# Patient Record
Sex: MALE | Race: OTHER | NOT HISPANIC OR LATINO
[De-identification: names, ages, dates, MRNs, and addresses within clinical notes are randomized per-mention and may not be internally consistent; named-entity substitution may affect disease eponyms.]

---

## 2023-09-07 PROBLEM — Z00.00 ENCOUNTER FOR PREVENTIVE HEALTH EXAMINATION: Status: ACTIVE | Noted: 2023-09-07

## 2023-11-21 ENCOUNTER — APPOINTMENT (OUTPATIENT)
Dept: NEUROLOGY | Facility: CLINIC | Age: 88
End: 2023-11-21

## 2024-07-19 ENCOUNTER — APPOINTMENT (OUTPATIENT)
Dept: OTOLARYNGOLOGY | Facility: CLINIC | Age: 89
End: 2024-07-19
Payer: MEDICARE

## 2024-07-19 VITALS — BODY MASS INDEX: 31.02 KG/M2 | WEIGHT: 200 LBS | HEIGHT: 67.5 IN

## 2024-07-19 DIAGNOSIS — H93.13 TINNITUS, BILATERAL: ICD-10-CM

## 2024-07-19 DIAGNOSIS — H90.3 SENSORINEURAL HEARING LOSS, BILATERAL: ICD-10-CM

## 2024-07-19 PROCEDURE — 99204 OFFICE O/P NEW MOD 45 MIN: CPT

## 2024-07-19 PROCEDURE — 92550 TYMPANOMETRY & REFLEX THRESH: CPT | Mod: 52

## 2024-07-19 PROCEDURE — 92557 COMPREHENSIVE HEARING TEST: CPT

## 2024-07-25 ENCOUNTER — INPATIENT (INPATIENT)
Facility: HOSPITAL | Age: 89
LOS: 2 days | Discharge: HOME HEALTH | DRG: 312 | End: 2024-07-28
Attending: INTERNAL MEDICINE | Admitting: STUDENT IN AN ORGANIZED HEALTH CARE EDUCATION/TRAINING PROGRAM
Payer: MEDICARE

## 2024-07-25 VITALS
WEIGHT: 199.96 LBS | TEMPERATURE: 98 F | OXYGEN SATURATION: 96 % | HEART RATE: 64 BPM | DIASTOLIC BLOOD PRESSURE: 72 MMHG | SYSTOLIC BLOOD PRESSURE: 133 MMHG | RESPIRATION RATE: 16 BRPM

## 2024-07-25 DIAGNOSIS — R55 SYNCOPE AND COLLAPSE: ICD-10-CM

## 2024-07-25 LAB
ALBUMIN SERPL ELPH-MCNC: 4.4 G/DL — SIGNIFICANT CHANGE UP (ref 3.5–5.2)
ALP SERPL-CCNC: 68 U/L — SIGNIFICANT CHANGE UP (ref 30–115)
ALT FLD-CCNC: 12 U/L — SIGNIFICANT CHANGE UP (ref 0–41)
ANION GAP SERPL CALC-SCNC: 14 MMOL/L — SIGNIFICANT CHANGE UP (ref 7–14)
AST SERPL-CCNC: 21 U/L — SIGNIFICANT CHANGE UP (ref 0–41)
BASOPHILS # BLD AUTO: 0.02 K/UL — SIGNIFICANT CHANGE UP (ref 0–0.2)
BASOPHILS NFR BLD AUTO: 0.1 % — SIGNIFICANT CHANGE UP (ref 0–1)
BILIRUB SERPL-MCNC: 1.2 MG/DL — SIGNIFICANT CHANGE UP (ref 0.2–1.2)
BUN SERPL-MCNC: 48 MG/DL — HIGH (ref 10–20)
CALCIUM SERPL-MCNC: 9.6 MG/DL — SIGNIFICANT CHANGE UP (ref 8.4–10.5)
CHLORIDE SERPL-SCNC: 108 MMOL/L — SIGNIFICANT CHANGE UP (ref 98–110)
CK SERPL-CCNC: 665 U/L — HIGH (ref 0–225)
CO2 SERPL-SCNC: 18 MMOL/L — SIGNIFICANT CHANGE UP (ref 17–32)
CREAT SERPL-MCNC: 1.7 MG/DL — HIGH (ref 0.7–1.5)
EGFR: 38 ML/MIN/1.73M2 — LOW
EOSINOPHIL # BLD AUTO: 0.01 K/UL — SIGNIFICANT CHANGE UP (ref 0–0.7)
EOSINOPHIL NFR BLD AUTO: 0.1 % — SIGNIFICANT CHANGE UP (ref 0–8)
GLUCOSE SERPL-MCNC: 131 MG/DL — HIGH (ref 70–99)
HCT VFR BLD CALC: 33.9 % — LOW (ref 42–52)
HGB BLD-MCNC: 10.9 G/DL — LOW (ref 14–18)
IMM GRANULOCYTES NFR BLD AUTO: 0.5 % — HIGH (ref 0.1–0.3)
LYMPHOCYTES # BLD AUTO: 0.4 K/UL — LOW (ref 1.2–3.4)
LYMPHOCYTES # BLD AUTO: 2.9 % — LOW (ref 20.5–51.1)
MCHC RBC-ENTMCNC: 30.4 PG — SIGNIFICANT CHANGE UP (ref 27–31)
MCHC RBC-ENTMCNC: 32.2 G/DL — SIGNIFICANT CHANGE UP (ref 32–37)
MCV RBC AUTO: 94.7 FL — HIGH (ref 80–94)
MONOCYTES # BLD AUTO: 0.69 K/UL — HIGH (ref 0.1–0.6)
MONOCYTES NFR BLD AUTO: 5 % — SIGNIFICANT CHANGE UP (ref 1.7–9.3)
NEUTROPHILS # BLD AUTO: 12.56 K/UL — HIGH (ref 1.4–6.5)
NEUTROPHILS NFR BLD AUTO: 91.4 % — HIGH (ref 42.2–75.2)
NRBC # BLD: 0 /100 WBCS — SIGNIFICANT CHANGE UP (ref 0–0)
PLATELET # BLD AUTO: 224 K/UL — SIGNIFICANT CHANGE UP (ref 130–400)
PMV BLD: 9.8 FL — SIGNIFICANT CHANGE UP (ref 7.4–10.4)
POTASSIUM SERPL-MCNC: 4.5 MMOL/L — SIGNIFICANT CHANGE UP (ref 3.5–5)
POTASSIUM SERPL-SCNC: 4.5 MMOL/L — SIGNIFICANT CHANGE UP (ref 3.5–5)
PROT SERPL-MCNC: 6.9 G/DL — SIGNIFICANT CHANGE UP (ref 6–8)
RBC # BLD: 3.58 M/UL — LOW (ref 4.7–6.1)
RBC # FLD: 13.5 % — SIGNIFICANT CHANGE UP (ref 11.5–14.5)
SODIUM SERPL-SCNC: 140 MMOL/L — SIGNIFICANT CHANGE UP (ref 135–146)
TROPONIN T, HIGH SENSITIVITY RESULT: 64 NG/L — CRITICAL HIGH (ref 6–21)
WBC # BLD: 13.75 K/UL — HIGH (ref 4.8–10.8)
WBC # FLD AUTO: 13.75 K/UL — HIGH (ref 4.8–10.8)

## 2024-07-25 PROCEDURE — 12034 INTMD RPR S/TR/EXT 7.6-12.5: CPT | Mod: GC

## 2024-07-25 PROCEDURE — 83735 ASSAY OF MAGNESIUM: CPT

## 2024-07-25 PROCEDURE — 80053 COMPREHEN METABOLIC PANEL: CPT

## 2024-07-25 PROCEDURE — 95819 EEG AWAKE AND ASLEEP: CPT

## 2024-07-25 PROCEDURE — 99285 EMERGENCY DEPT VISIT HI MDM: CPT | Mod: 25

## 2024-07-25 PROCEDURE — 74177 CT ABD & PELVIS W/CONTRAST: CPT | Mod: 26,MC

## 2024-07-25 PROCEDURE — 85025 COMPLETE CBC W/AUTO DIFF WBC: CPT

## 2024-07-25 PROCEDURE — 71260 CT THORAX DX C+: CPT | Mod: 26,MC

## 2024-07-25 PROCEDURE — 36415 COLL VENOUS BLD VENIPUNCTURE: CPT

## 2024-07-25 PROCEDURE — 83036 HEMOGLOBIN GLYCOSYLATED A1C: CPT

## 2024-07-25 PROCEDURE — 93010 ELECTROCARDIOGRAM REPORT: CPT

## 2024-07-25 PROCEDURE — 84484 ASSAY OF TROPONIN QUANT: CPT

## 2024-07-25 PROCEDURE — 84100 ASSAY OF PHOSPHORUS: CPT

## 2024-07-25 PROCEDURE — 93306 TTE W/DOPPLER COMPLETE: CPT

## 2024-07-25 PROCEDURE — 93005 ELECTROCARDIOGRAM TRACING: CPT

## 2024-07-25 PROCEDURE — 72125 CT NECK SPINE W/O DYE: CPT | Mod: 26,MC

## 2024-07-25 PROCEDURE — 73090 X-RAY EXAM OF FOREARM: CPT | Mod: 26,LT

## 2024-07-25 PROCEDURE — 97162 PT EVAL MOD COMPLEX 30 MIN: CPT | Mod: GP

## 2024-07-25 PROCEDURE — 73080 X-RAY EXAM OF ELBOW: CPT | Mod: 26,LT

## 2024-07-25 PROCEDURE — 84443 ASSAY THYROID STIM HORMONE: CPT

## 2024-07-25 PROCEDURE — 73110 X-RAY EXAM OF WRIST: CPT | Mod: 26,LT

## 2024-07-25 PROCEDURE — 73130 X-RAY EXAM OF HAND: CPT | Mod: 26,LT

## 2024-07-25 PROCEDURE — 93010 ELECTROCARDIOGRAM REPORT: CPT | Mod: 77

## 2024-07-25 PROCEDURE — 12004 RPR S/N/AX/GEN/TRK7.6-12.5CM: CPT | Mod: GC

## 2024-07-25 PROCEDURE — 70450 CT HEAD/BRAIN W/O DYE: CPT | Mod: 26,MC

## 2024-07-25 PROCEDURE — 82550 ASSAY OF CK (CPK): CPT

## 2024-07-25 PROCEDURE — 80061 LIPID PANEL: CPT

## 2024-07-25 RX ORDER — CLOSTRIDIUM TETANI TOXOID ANTIGEN (FORMALDEHYDE INACTIVATED), CORYNEBACTERIUM DIPHTHERIAE TOXOID ANTIGEN (FORMALDEHYDE INACTIVATED), BORDETELLA PERTUSSIS TOXOID ANTIGEN (GLUTARALDEHYDE INACTIVATED), BORDETELLA PERTUSSIS FILAMENTOUS HEMAGGLUTININ ANTIGEN (FORMALDEHYDE INACTIVATED), BORDETELLA PERTUSSIS PERTACTIN ANTIGEN, AND BORDETELLA PERTUSSIS FIMBRIAE 2/3 ANTIGEN 5; 2; 2.5; 5; 3; 5 [LF]/.5ML; [LF]/.5ML; UG/.5ML; UG/.5ML; UG/.5ML; UG/.5ML
0.5 INJECTION, SUSPENSION INTRAMUSCULAR ONCE
Refills: 0 | Status: DISCONTINUED | OUTPATIENT
Start: 2024-07-25 | End: 2024-07-28

## 2024-07-25 NOTE — ED PROVIDER NOTE - OBJECTIVE STATEMENT
Patient is a 89-year-old male past medical history of dementia presenting to ED for evaluation of syncopal episode earlier today.  Patient was found by aide crawling on the ground with laceration to left side of his face and left arm, patient was too weak to get up and walk on his own.  Patient denies any complaints on arrival to the ED.

## 2024-07-25 NOTE — ED ADULT NURSE NOTE - NSFALLHARMRISKINTERV_ED_ALL_ED

## 2024-07-25 NOTE — ED PROVIDER NOTE - PROGRESS NOTE DETAILS
AE: Imaging is unremarkable. Lacerations are being repaired. Labs significant for AILYN, elevated troponin, and elevated CK. Will admit to [facility] for syncope workup.

## 2024-07-25 NOTE — ED PROVIDER NOTE - PHYSICAL EXAMINATION
Constitutional: elderly appearing  TRAUMA: ABC intact. GCS 15. FAST negative.  Head: abrasions to left side of the face  Eyes: PERRL. EOMI. No Raccoon eyes.   ENT: No nasal discharge. No septal hematoma. No Durbin sign. Mucous membranes moist.  Neck: Supple. Painless ROM. No midline tenderness, stepoffs.  Cardiovascular: Normal S1, S2. Regular rate and rhythm. No murmurs, rubs, or gallops.  Pulmonary: Normal respiratory rate and effort. Lungs clear to auscultation bilaterally. No wheezing, rales, or rhonchi.  CHEST: No chest wall tenderness, crepitus.  Abdominal: Soft. Nondistended. Nontender. No rebound, guarding, rigidity.  BACK: No midline T/L/S tenderness, stepoffs. No saddle paresthesia.  Extremities. Pelvis stable. No traumatic deformities, tenderness of extremities.  Skin: 2 cm and 5 cm laceration to dorsal aspect of left forearm  Neuro: AAOx3. Strength 5/5 in all extremities. Sensation intact throughout. No focal neurological deficits.  Psych: Normal mood. Normal affect.

## 2024-07-25 NOTE — ED PROVIDER NOTE - CLINICAL SUMMARY MEDICAL DECISION MAKING FREE TEXT BOX
Plan for CT pan scans to evaluate for intra-abdominal intrathoracic injuries.  Laceration repair.  And likely will need admission is unable to ambulate.       b Plan for CT pan scans to evaluate for intra-abdominal intrathoracic injuries.  Laceration repair.  And likely will need admission is unable to ambulate.      CT pan scan without any acute traumatic injury

## 2024-07-25 NOTE — ED PROVIDER NOTE - ATTENDING CONTRIBUTION TO CARE
89-year-old male to ED with left face and left arm laceration.  Patient was found down by living partner when she came home after work.  Patient states he fell this morning was crawling around on the house because he could not get back up.  No LOC no nausea vomiting denies any seizure activity.  He takes aspirin but no other anticoagulation.  No evidence of extremity bony injury but he does have lacerations to the left forearm and left face.  Patient has no specific complaints.

## 2024-07-25 NOTE — ED ADULT NURSE NOTE - OBJECTIVE STATEMENT
patient states he fell this morning and was unable to get up due to weakness. was found on floor after several hours. patient is denying any pain. patient has several lacerations to left arm. patient states he hit his head but did not pass out. denies bt. denies any chest pain/dizziness/sob

## 2024-07-25 NOTE — ED PROVIDER NOTE - CARE PLAN
1 Principal Discharge DX:	Syncope  Secondary Diagnosis:	AILYN (acute kidney injury)  Secondary Diagnosis:	Elevated troponin  Secondary Diagnosis:	Multiple lacerations

## 2024-07-26 ENCOUNTER — TRANSCRIPTION ENCOUNTER (OUTPATIENT)
Age: 89
End: 2024-07-26

## 2024-07-26 ENCOUNTER — RESULT REVIEW (OUTPATIENT)
Age: 89
End: 2024-07-26

## 2024-07-26 LAB
A1C WITH ESTIMATED AVERAGE GLUCOSE RESULT: 6.1 % — HIGH (ref 4–5.6)
ALBUMIN SERPL ELPH-MCNC: 3.7 G/DL — SIGNIFICANT CHANGE UP (ref 3.5–5.2)
ALP SERPL-CCNC: 58 U/L — SIGNIFICANT CHANGE UP (ref 30–115)
ALT FLD-CCNC: 12 U/L — SIGNIFICANT CHANGE UP (ref 0–41)
ANION GAP SERPL CALC-SCNC: 12 MMOL/L — SIGNIFICANT CHANGE UP (ref 7–14)
AST SERPL-CCNC: 25 U/L — SIGNIFICANT CHANGE UP (ref 0–41)
BASOPHILS # BLD AUTO: 0.03 K/UL — SIGNIFICANT CHANGE UP (ref 0–0.2)
BASOPHILS NFR BLD AUTO: 0.3 % — SIGNIFICANT CHANGE UP (ref 0–1)
BILIRUB SERPL-MCNC: 1.4 MG/DL — HIGH (ref 0.2–1.2)
BUN SERPL-MCNC: 42 MG/DL — HIGH (ref 10–20)
CALCIUM SERPL-MCNC: 9.1 MG/DL — SIGNIFICANT CHANGE UP (ref 8.4–10.5)
CHLORIDE SERPL-SCNC: 107 MMOL/L — SIGNIFICANT CHANGE UP (ref 98–110)
CHOLEST SERPL-MCNC: 97 MG/DL — SIGNIFICANT CHANGE UP
CK SERPL-CCNC: 1109 U/L — HIGH (ref 0–225)
CK SERPL-CCNC: 1154 U/L — HIGH (ref 0–225)
CO2 SERPL-SCNC: 20 MMOL/L — SIGNIFICANT CHANGE UP (ref 17–32)
CREAT SERPL-MCNC: 1.8 MG/DL — HIGH (ref 0.7–1.5)
EGFR: 35 ML/MIN/1.73M2 — LOW
EOSINOPHIL # BLD AUTO: 0.04 K/UL — SIGNIFICANT CHANGE UP (ref 0–0.7)
EOSINOPHIL NFR BLD AUTO: 0.4 % — SIGNIFICANT CHANGE UP (ref 0–8)
ESTIMATED AVERAGE GLUCOSE: 128 MG/DL — HIGH (ref 68–114)
GLUCOSE SERPL-MCNC: 101 MG/DL — HIGH (ref 70–99)
HCT VFR BLD CALC: 30.6 % — LOW (ref 42–52)
HDLC SERPL-MCNC: 44 MG/DL — SIGNIFICANT CHANGE UP
HGB BLD-MCNC: 9.9 G/DL — LOW (ref 14–18)
IMM GRANULOCYTES NFR BLD AUTO: 0.6 % — HIGH (ref 0.1–0.3)
LIPID PNL WITH DIRECT LDL SERPL: 43 MG/DL — SIGNIFICANT CHANGE UP
LYMPHOCYTES # BLD AUTO: 0.81 K/UL — LOW (ref 1.2–3.4)
LYMPHOCYTES # BLD AUTO: 7.3 % — LOW (ref 20.5–51.1)
MAGNESIUM SERPL-MCNC: 2.2 MG/DL — SIGNIFICANT CHANGE UP (ref 1.8–2.4)
MCHC RBC-ENTMCNC: 30.4 PG — SIGNIFICANT CHANGE UP (ref 27–31)
MCHC RBC-ENTMCNC: 32.4 G/DL — SIGNIFICANT CHANGE UP (ref 32–37)
MCV RBC AUTO: 93.9 FL — SIGNIFICANT CHANGE UP (ref 80–94)
MONOCYTES # BLD AUTO: 0.93 K/UL — HIGH (ref 0.1–0.6)
MONOCYTES NFR BLD AUTO: 8.4 % — SIGNIFICANT CHANGE UP (ref 1.7–9.3)
NEUTROPHILS # BLD AUTO: 9.18 K/UL — HIGH (ref 1.4–6.5)
NEUTROPHILS NFR BLD AUTO: 83 % — HIGH (ref 42.2–75.2)
NON HDL CHOLESTEROL: 53 MG/DL — SIGNIFICANT CHANGE UP
NRBC # BLD: 0 /100 WBCS — SIGNIFICANT CHANGE UP (ref 0–0)
PLATELET # BLD AUTO: 224 K/UL — SIGNIFICANT CHANGE UP (ref 130–400)
PMV BLD: 9.8 FL — SIGNIFICANT CHANGE UP (ref 7.4–10.4)
POTASSIUM SERPL-MCNC: 4.4 MMOL/L — SIGNIFICANT CHANGE UP (ref 3.5–5)
POTASSIUM SERPL-SCNC: 4.4 MMOL/L — SIGNIFICANT CHANGE UP (ref 3.5–5)
PROT SERPL-MCNC: 5.8 G/DL — LOW (ref 6–8)
RBC # BLD: 3.26 M/UL — LOW (ref 4.7–6.1)
RBC # FLD: 13.7 % — SIGNIFICANT CHANGE UP (ref 11.5–14.5)
SODIUM SERPL-SCNC: 139 MMOL/L — SIGNIFICANT CHANGE UP (ref 135–146)
TRIGL SERPL-MCNC: 54 MG/DL — SIGNIFICANT CHANGE UP
TROPONIN T, HIGH SENSITIVITY RESULT: 62 NG/L — CRITICAL HIGH (ref 6–21)
TROPONIN T, HIGH SENSITIVITY RESULT: 89 NG/L — CRITICAL HIGH (ref 6–21)
TSH SERPL-MCNC: 0.58 UIU/ML — SIGNIFICANT CHANGE UP (ref 0.27–4.2)
WBC # BLD: 11.06 K/UL — HIGH (ref 4.8–10.8)
WBC # FLD AUTO: 11.06 K/UL — HIGH (ref 4.8–10.8)

## 2024-07-26 PROCEDURE — 95816 EEG AWAKE AND DROWSY: CPT | Mod: 26

## 2024-07-26 PROCEDURE — 99223 1ST HOSP IP/OBS HIGH 75: CPT | Mod: AI

## 2024-07-26 PROCEDURE — 93306 TTE W/DOPPLER COMPLETE: CPT | Mod: 26

## 2024-07-26 RX ORDER — ONDANSETRON HCL/PF 4 MG/2 ML
4 VIAL (ML) INJECTION EVERY 8 HOURS
Refills: 0 | Status: DISCONTINUED | OUTPATIENT
Start: 2024-07-26 | End: 2024-07-28

## 2024-07-26 RX ORDER — ATORVASTATIN CALCIUM 40 MG/1
20 TABLET, FILM COATED ORAL AT BEDTIME
Refills: 0 | Status: DISCONTINUED | OUTPATIENT
Start: 2024-07-26 | End: 2024-07-28

## 2024-07-26 RX ORDER — HEPARIN SODIUM 1000 [USP'U]/ML
5000 INJECTION, SOLUTION INTRAVENOUS; SUBCUTANEOUS EVERY 12 HOURS
Refills: 0 | Status: DISCONTINUED | OUTPATIENT
Start: 2024-07-26 | End: 2024-07-28

## 2024-07-26 RX ORDER — ATORVASTATIN CALCIUM 40 MG/1
1 TABLET, FILM COATED ORAL
Refills: 0 | DISCHARGE

## 2024-07-26 RX ORDER — MAGNESIUM, ALUMINUM HYDROXIDE 200-225/5
30 SUSPENSION, ORAL (FINAL DOSE FORM) ORAL EVERY 4 HOURS
Refills: 0 | Status: DISCONTINUED | OUTPATIENT
Start: 2024-07-26 | End: 2024-07-28

## 2024-07-26 RX ORDER — MELATONIN 3 MG
3 TABLET ORAL AT BEDTIME
Refills: 0 | Status: DISCONTINUED | OUTPATIENT
Start: 2024-07-26 | End: 2024-07-28

## 2024-07-26 RX ORDER — ACETAMINOPHEN 500 MG
650 TABLET ORAL EVERY 6 HOURS
Refills: 0 | Status: DISCONTINUED | OUTPATIENT
Start: 2024-07-26 | End: 2024-07-28

## 2024-07-26 RX ORDER — DEXTROSE MONOHYDRATE, SODIUM CHLORIDE, SODIUM LACTATE, CALCIUM CHLORIDE, MAGNESIUM CHLORIDE 1.5; 538; 448; 18.4; 5.08 G/100ML; MG/100ML; MG/100ML; MG/100ML; MG/100ML
1000 SOLUTION INTRAPERITONEAL
Refills: 0 | Status: DISCONTINUED | OUTPATIENT
Start: 2024-07-26 | End: 2024-07-28

## 2024-07-26 RX ADMIN — ATORVASTATIN CALCIUM 20 MILLIGRAM(S): 40 TABLET, FILM COATED ORAL at 21:33

## 2024-07-26 RX ADMIN — DEXTROSE MONOHYDRATE, SODIUM CHLORIDE, SODIUM LACTATE, CALCIUM CHLORIDE, MAGNESIUM CHLORIDE 75 MILLILITER(S): 1.5; 538; 448; 18.4; 5.08 SOLUTION INTRAPERITONEAL at 08:00

## 2024-07-26 NOTE — PHYSICAL THERAPY INITIAL EVALUATION ADULT - ADDITIONAL COMMENTS
pt lives in a private house with his girlfriend, 5+5 steps to enter with B rails, no steps inside, PTA pt amb with RW and required assist with ADLs

## 2024-07-26 NOTE — PHYSICAL THERAPY INITIAL EVALUATION ADULT - GENERAL OBSERVATIONS, REHAB EVAL
13:45-14:29 44 min  pt received in bed in NAD, family at the b/s, pt agreeable to PT, RN disconnected IV for amb, pt had no c/o, orthostatic BPs from earlier were negative

## 2024-07-26 NOTE — DISCHARGE NOTE NURSING/CASE MANAGEMENT/SOCIAL WORK - PATIENT PORTAL LINK FT
You can access the FollowMyHealth Patient Portal offered by [facility] by registering at the following website: [email or web address]. By joining [facility]’s FollowMyHealth portal, you will also be able to view your health information using other applications (apps) compatible with our system.

## 2024-07-26 NOTE — H&P ADULT - HISTORY OF PRESENT ILLNESS
89 years old male patient with PMHx of HTN, HLD , CKD 3 brought to the ed for the evaluation of the fall. Hx taken from the patient and is aox3 at time of interview. Patient states that at around 9am he was going to the bathroom, and on way he tipped and fell. He does not remember any events after that. Patient is a poor historian. Hx taken form the spouse Yaquelin at the bedsides as well. Spouse states that when she came back from the work at 4pm, she found the patient on floor socked with blood. Patient was unable to get up on her own. She also noted the blood in the bathroom as well. Spouse also states that, patient is independent in activities of his daily life.  Further review and medical hx is unable to be obtained due to patient medical condition.     ed vitals    · BP Systolic	133 mm Hg  · BP Diastolic	72 mm Hg  · Heart Rate	64 /min  · Respiration Rate (breaths/min)	16 /min  · Temp (F)	98 Degrees F  · Temp (C)	36.7 Degrees C  · Temp site	oral  · SpO2 (%)	96 %  · O2 Delivery/Oxygen Delivery Method	room air  · Temp at ED Arrival (C)	36.7 Degrees C    Labs    wbc 13, hb 10, Trops 64-->62, BUN/Creat 48/1.7, gfr 38, cpk 665    Imaging    CT chest/abdomen/pelvis: IMPRESSION: No current CT evidence of acute traumatic injury to the chest and abdomen. Sigmoid diverticulosis. Extensive coronary artery calcifications    CT head/cervical spine: no acute pathology

## 2024-07-26 NOTE — H&P ADULT - NSHPREVIEWOFSYSTEMS_GEN_ALL_CORE
REVIEW OF SYSTEMS:    CONSTITUTIONAL:  No weakness, fevers or chills  EYES/ENT:  No visual changes;  No vertigo or throat pain   NECK:  No pain or stiffness  RESPIRATORY:  No cough, wheezing, hemoptysis; No shortness of breath  CARDIOVASCULAR:  No chest pain or palpitations  GASTROINTESTINAL:  No abdominal or epigastric pain. No nausea, vomiting, or hematemesis; No diarrhea or constipation. No melena or hematochezia.  GENITOURINARY:  No dysuria, frequency or hematuria  NEUROLOGICAL:  No numbness or weakness  SKIN:  bruises over upper limbs, laceration over forehead and left arm

## 2024-07-26 NOTE — ED PROCEDURE NOTE - CPROC ED TIME OUT STATEMENT1
“Patient's name, , procedure and correct site were confirmed during the [place] Timeout.”
“Patient's name, , procedure and correct site were confirmed during the [place] Timeout.”
“Patient's name, , procedure and correct site were confirmed during the [place] Timeout.”

## 2024-07-26 NOTE — H&P ADULT - ASSESSMENT
89 years old male patient with PMHx of HTN, HLD , CKD 3 brought to the ed for the evaluation of the fall. Hx taken from the patient and is aox3 at time of interview. Patient states that at around 9am he was going to the bathroom, and on way he tipped and fell. He does not remember any events after that. Patient is a poor historian. Hx taken form the spouse Yaquelin at the bedsides as well. Spouse states that when she came back from the work at 4pm, she found the patient on floor socked with blood. Patient was unable to get up on her own. She also noted the blood in the bathroom as well. Spouse also states that, patient is independent in activities of his daily life.       #Unwitnessed Fall with laceration to left arm   #Elevated trops   #Eleaveted CPK  - EKG   - eeg  - echo  - doppler carotid  - TSH  - Telemetry monitor  - orthostatics vitals  - PT/OT consult  - s/p laceration repair on left arm in ed     #AILYN in CKDIII  - unknown baseline creat.   - creat 7/25... 1.7   - likely dehydration  - IV Fluid LR 75ml/hr   - avoid nephrotoxic drugs  - adjust meds for egfr       #HLD  #HTN  - Hold benazapril.  -  cw hydrochlorothiazide  - cw lipitor       DVT PPX: scd  GI PPX: none   DIET: dash   ACTIVITY:   CODE STATUS: full   DISPOSITION:     PENDING:               DVT PPX:   GI PPX:   DIET:   ACTIVITY:   CODE STATUS:   DISPOSITION:     PENDING:  89 years old male patient with PMHx of HTN, HLD , CKD 3 brought to the ed for the evaluation of the fall. Hx taken from the patient and is aox3 at time of interview. Patient states that at around 9am he was going to the bathroom, and on way he tipped and fell. He does not remember any events after that. Patient is a poor historian. Hx taken form the spouse Yaquelin at the bedsides as well. Spouse states that when she came back from the work at 4pm, she found the patient on floor socked with blood. Patient was unable to get up on her own. She also noted the blood in the bathroom as well. Spouse also states that, patient is independent in activities of his daily life.       #Unwitnessed Fall with laceration to left arm   #Elevated trops   #Eleaveted CPK  - fu EKG   -fu  eeg  - fu echo  - fu doppler carotid  - fu TSH  - Telemetry monitor  - orthostatics vitals  - PT/OT consult  - s/p laceration repair on left arm in ed     #AILYN in CKDIII  - unknown baseline creat.   - creat 7/25... 1.7   - likely dehydration  - IV Fluid LR 75ml/hr . if no improvement send urine electrolytes, US kub   - avoid nephrotoxic drugs  - adjust meds for egfr       #HLD  #HTN  - Hold benazapril due to aliyn  - hold  hydrochlorothiazide due to ailyn   - cw Lipitor       DVT PPX: scd  GI PPX: none   DIET: dash   ACTIVITY:   CODE STATUS: full   DISPOSITION:     PENDING:       MED REC CONFIRMED WITH WIFE AT BEDSIDE

## 2024-07-26 NOTE — DISCHARGE NOTE NURSING/CASE MANAGEMENT/SOCIAL WORK - NSDCPEFALRISK_GEN_ALL_CORE
For information on Fall & Injury Prevention, visit: [email or web address] OR  [email or web address] OR  https://www.cdc.gov/steadi/patient.html

## 2024-07-26 NOTE — PATIENT PROFILE ADULT - FUNCTIONAL ASSESSMENT - BASIC MOBILITY 6.
3-calculated by average/Not able to assess (calculate score using [facility] averaging method)

## 2024-07-26 NOTE — H&P ADULT - NSHPPHYSICALEXAM_GEN_ALL_CORE
LOS: 1d    VITALS:   T(C): 37.1 (07-26-24 @ 03:13), Max: 37.1 (07-26-24 @ 03:13)  HR: 63 (07-26-24 @ 03:13) (61 - 64)  BP: 143/74 (07-26-24 @ 03:13) (126/69 - 143/74)  RR: 18 (07-26-24 @ 03:13) (16 - 18)  SpO2: 99% (07-26-24 @ 03:13) (96% - 99%)    GENERAL: NAD, lying in bed comfortably  HEAD:   abrasions to left side of the face  EYES: EOMI, PERRLA, conjunctiva and sclera clear  ENT: Moist mucous membranes  NECK: Supple, No JVD  CHEST/LUNG: Clear to auscultation bilaterally; No rales, rhonchi, wheezing, or rubs. Unlabored respirations  HEART: Regular rate and rhythm; No murmurs, rubs, or gallops  ABDOMEN: BSx4; Soft, nontender, nondistended  EXTREMITIES:  2+ Peripheral Pulses, brisk capillary refill. No clubbing, cyanosis, or edema  NERVOUS SYSTEM:  A&Ox3, no focal deficits   SKIN:  2 cm and 5 cm laceration to dorsal aspect of left forearm

## 2024-07-26 NOTE — PATIENT PROFILE ADULT - FALL HARM RISK - HARM RISK INTERVENTIONS

## 2024-07-26 NOTE — ED PROCEDURE NOTE - DEPTH OF REPAIR FOR WOUND CLOSURE
skin
Please make an appointment to follow up with your pediatrician for 1-2 days after discharge.

## 2024-07-27 LAB
ALBUMIN SERPL ELPH-MCNC: 3.9 G/DL — SIGNIFICANT CHANGE UP (ref 3.5–5.2)
ALP SERPL-CCNC: 56 U/L — SIGNIFICANT CHANGE UP (ref 30–115)
ALT FLD-CCNC: 16 U/L — SIGNIFICANT CHANGE UP (ref 0–41)
ANION GAP SERPL CALC-SCNC: 12 MMOL/L — SIGNIFICANT CHANGE UP (ref 7–14)
AST SERPL-CCNC: 28 U/L — SIGNIFICANT CHANGE UP (ref 0–41)
BASOPHILS # BLD AUTO: 0.04 K/UL — SIGNIFICANT CHANGE UP (ref 0–0.2)
BASOPHILS NFR BLD AUTO: 0.4 % — SIGNIFICANT CHANGE UP (ref 0–1)
BILIRUB SERPL-MCNC: 1 MG/DL — SIGNIFICANT CHANGE UP (ref 0.2–1.2)
BUN SERPL-MCNC: 47 MG/DL — HIGH (ref 10–20)
CALCIUM SERPL-MCNC: 9.2 MG/DL — SIGNIFICANT CHANGE UP (ref 8.4–10.5)
CHLORIDE SERPL-SCNC: 106 MMOL/L — SIGNIFICANT CHANGE UP (ref 98–110)
CK SERPL-CCNC: 964 U/L — HIGH (ref 0–225)
CO2 SERPL-SCNC: 21 MMOL/L — SIGNIFICANT CHANGE UP (ref 17–32)
CREAT SERPL-MCNC: 1.8 MG/DL — HIGH (ref 0.7–1.5)
EGFR: 35 ML/MIN/1.73M2 — LOW
EOSINOPHIL # BLD AUTO: 0.23 K/UL — SIGNIFICANT CHANGE UP (ref 0–0.7)
EOSINOPHIL NFR BLD AUTO: 2.4 % — SIGNIFICANT CHANGE UP (ref 0–8)
GLUCOSE SERPL-MCNC: 90 MG/DL — SIGNIFICANT CHANGE UP (ref 70–99)
HCT VFR BLD CALC: 28.1 % — LOW (ref 42–52)
HGB BLD-MCNC: 9.2 G/DL — LOW (ref 14–18)
IMM GRANULOCYTES NFR BLD AUTO: 0.5 % — HIGH (ref 0.1–0.3)
LYMPHOCYTES # BLD AUTO: 1.12 K/UL — LOW (ref 1.2–3.4)
LYMPHOCYTES # BLD AUTO: 11.8 % — LOW (ref 20.5–51.1)
MAGNESIUM SERPL-MCNC: 2.1 MG/DL — SIGNIFICANT CHANGE UP (ref 1.8–2.4)
MCHC RBC-ENTMCNC: 30.8 PG — SIGNIFICANT CHANGE UP (ref 27–31)
MCHC RBC-ENTMCNC: 32.7 G/DL — SIGNIFICANT CHANGE UP (ref 32–37)
MCV RBC AUTO: 94 FL — SIGNIFICANT CHANGE UP (ref 80–94)
MONOCYTES # BLD AUTO: 0.81 K/UL — HIGH (ref 0.1–0.6)
MONOCYTES NFR BLD AUTO: 8.5 % — SIGNIFICANT CHANGE UP (ref 1.7–9.3)
NEUTROPHILS # BLD AUTO: 7.28 K/UL — HIGH (ref 1.4–6.5)
NEUTROPHILS NFR BLD AUTO: 76.4 % — HIGH (ref 42.2–75.2)
NRBC # BLD: 0 /100 WBCS — SIGNIFICANT CHANGE UP (ref 0–0)
PHOSPHATE SERPL-MCNC: 3.1 MG/DL — SIGNIFICANT CHANGE UP (ref 2.1–4.9)
PLATELET # BLD AUTO: 217 K/UL — SIGNIFICANT CHANGE UP (ref 130–400)
PMV BLD: 10.2 FL — SIGNIFICANT CHANGE UP (ref 7.4–10.4)
POTASSIUM SERPL-MCNC: 4.3 MMOL/L — SIGNIFICANT CHANGE UP (ref 3.5–5)
POTASSIUM SERPL-SCNC: 4.3 MMOL/L — SIGNIFICANT CHANGE UP (ref 3.5–5)
PROT SERPL-MCNC: 5.6 G/DL — LOW (ref 6–8)
RBC # BLD: 2.99 M/UL — LOW (ref 4.7–6.1)
RBC # FLD: 13.8 % — SIGNIFICANT CHANGE UP (ref 11.5–14.5)
SODIUM SERPL-SCNC: 139 MMOL/L — SIGNIFICANT CHANGE UP (ref 135–146)
WBC # BLD: 9.53 K/UL — SIGNIFICANT CHANGE UP (ref 4.8–10.8)
WBC # FLD AUTO: 9.53 K/UL — SIGNIFICANT CHANGE UP (ref 4.8–10.8)

## 2024-07-27 PROCEDURE — 99232 SBSQ HOSP IP/OBS MODERATE 35: CPT

## 2024-07-27 PROCEDURE — 99222 1ST HOSP IP/OBS MODERATE 55: CPT

## 2024-07-27 RX ORDER — BACITRACIN 500 UNIT/G
1 OINTMENT (GRAM) TOPICAL
Refills: 0 | Status: DISCONTINUED | OUTPATIENT
Start: 2024-07-27 | End: 2024-07-28

## 2024-07-27 RX ADMIN — HEPARIN SODIUM 5000 UNIT(S): 1000 INJECTION, SOLUTION INTRAVENOUS; SUBCUTANEOUS at 05:15

## 2024-07-27 RX ADMIN — Medication 1 APPLICATION(S): at 17:46

## 2024-07-27 RX ADMIN — HEPARIN SODIUM 5000 UNIT(S): 1000 INJECTION, SOLUTION INTRAVENOUS; SUBCUTANEOUS at 17:46

## 2024-07-27 RX ADMIN — ATORVASTATIN CALCIUM 20 MILLIGRAM(S): 40 TABLET, FILM COATED ORAL at 21:06

## 2024-07-27 RX ADMIN — DEXTROSE MONOHYDRATE, SODIUM CHLORIDE, SODIUM LACTATE, CALCIUM CHLORIDE, MAGNESIUM CHLORIDE 75 MILLILITER(S): 1.5; 538; 448; 18.4; 5.08 SOLUTION INTRAPERITONEAL at 21:07

## 2024-07-27 NOTE — EEG REPORT - NS EEG TEXT BOX
[facility]   COMPREHENSIVE EPILEPSY CENTER   REPORT OF ROUTINE VIDEO EEG     [facility]: [address] [place] [address] , [address], [place] [address], Ph#: [phone]  [facility]: [id number], [place] [address], Ph#: [phone]  Office: [address] [place] [address], [facility] [address], [place] [address] Ph#: [phone]    Patient Name: JESÚS JACOBO  Age and : 89y ([birth year])  MRN #: [id number]  Location: [place] [address] [address]  Referring Physician: Jose Avalos    Study Date: 24    _____________________________________________________________  TECHNICAL INFORMATION    Placement and Labeling of Electrodes:  The EEG was performed utilizing 20 channels referential EEG connections (coronal over temporal over parasagittal montage) using all standard 10-20 electrode placements with EKG.  Recording was at a sampling rate of 256 samples per second per channel.  Time synchronized digital video recording was done simultaneously with EEG recording.  A low light infrared camera was used for low light recording.  Alhaji and seizure detection algorithms were utilized.    _____________________________________________________________  HISTORY    Patient is a 89y old  Male who presents with a chief complaint of fall (2024 03:16)      PERTINENT MEDICATION:  MEDICATIONS  (STANDING):  atorvastatin 20 milliGRAM(s) Oral at bedtime  diphtheria/tetanus/pertussis (acellular) Vaccine (Adacel) 0.5 milliLiter(s) IntraMuscular once  heparin   Injectable 5000 Unit(s) SubCutaneous every 12 hours  lactated ringers. 1000 milliLiter(s) (75 mL/Hr) IV Continuous <Continuous>    _____________________________________________________________  STUDY INTERPRETATION    Findings: The background was continuous, spontaneously variable and reactive. During wakefulness, the posterior dominant rhythm consisted of symmetric, well-modulated 7.5 Hz activity, with amplitude to 30 uV, that attenuated to eye opening.  Low amplitude frontal beta was noted in wakefulness.    Background Slowing:  -Slowing of PDR    Focal Slowing:   None was present.    Sleep Background:  Stage II sleep transients were not recorded.  Drowsiness and stage II sleep transients were not recorded.    Other Non-Epileptiform Findings:  None were present.    Interictal Epileptiform Activity:   None were present.    Events:  Clinical events: None recorded.  Seizures: None recorded.    Artifacts:  Intermittent myogenic and movement artifacts were noted.    ECG:  The heart rate on single channel ECG was predominantly between 60-80 BPM.    _____________________________________________________________  EEG SUMMARY/CLASSIFICATION    Abnormal EEG in the awake states.  -Mild generalized background slowing    _____________________________________________________________  EEG IMPRESSION/CLINICAL CORRELATE  Abnormal EEG study.  1. Mild nonspecific diffuse or multifocal cerebral dysfunction.   2. No epileptiform pattern or seizure seen.    Tyler Mcclendon MD  Neurology Attending Physician

## 2024-07-27 NOTE — PROGRESS NOTE ADULT - ASSESSMENT
89 years old male patient with history of HTN, HLD , CKD 3 was brought to ED after unwitnessed fall and trauma to head and left arm, patient was going to the bathroom, and on way he tipped and fell. He denies loss of consciousness, Patient was feeling weak and unable to get up, in the ED vital signs were stable, he was noted with multiple lacerations of left arm and small abrasion on th left face. Labs showed Cr 1.8, Troponin 64, , EKG showed RBBB and LAFB, CT head , cervical spine, chest and abdomen showed no acute traumatic injury.     A/P:   Unwitnessed Fall:   Facial and left arm trauma:   Patient denies syncope, felt dizzy when he stood up, possibly orthostatic changes.   EKG showed bifascicular block, RBBB and LAFB,   Troponin 64>62>89, >1109>964  CT chest/abdomen/pelvis: IMPRESSION: No current CT evidence of acute traumatic injury to the chest and abdomen. Sigmoid diverticulosis. Extensive coronary artery calcifications  CT head/cervical spine: no acute pathology  orthostatic BP negative, hold Benazepril and HCTZ.  Echo showed normal LVEF 55%  Tele showed episode of SVT vs AFIB for >10 minutes, EP consulted.   PT, fall precaution.     Acute Kidney Injury vs CKD:   Cr 1.8, monitor Cr after IV contrast  s/p IV fluid, Cr is stable, CPK is trending down, encourage oral feed.     HTN: Hold BP as above.     DVT Prophylaxis: Heparin SC> .    Possible discharge home today.

## 2024-07-27 NOTE — CONSULT NOTE ADULT - SUBJECTIVE AND OBJECTIVE BOX
Patient is a 89y old  Male who presents with a chief complaint of fall (27 Jul 2024 12:43)    HPI: Patient is a 89 years old male patient with PMHx of HTN, HLD , CKD 3 brought to the ed for the evaluation of the fall. Hx taken from the patient and is aox3 at time of interview. Patient states that at around 9am he was going to the bathroom, and on way he tipped and fell. He does not remember any events after that. Patient is a poor historian. Hx taken form the spouse Yaquelin at the bedsides as well. Spouse states that when she came back from the work at 4pm, she found the patient on floor socked with blood. Patient was unable to get up on her own. She also noted the blood in the bathroom as well. Spouse also states that, patient is independent in activities of his daily life. EP consulted for episode of SVT overnight. Patient states he was sleeping and had no symptoms. No prior complaints of palpitations or dizziness. Pt unsure who is cardiologist is.    PAST MEDICAL & SURGICAL HISTORY:  HTN  HLD  CKD3      PREVIOUS DIAGNOSTIC TESTING:      ECHO  FINDINGS:  < from: TTE Echo Complete w/o Contrast w/ Doppler (07.26.24 @ 10:13) >  Summary:   1. Technically difficult study.   2. Normal left ventricular internal cavity size.   3. Normal global left ventricular systolic function.   4. LV Ejection Fraction by Teresa's Method with a biplane EF of 56 %.   5. Spectral Doppler shows pseudonormal pattern of left ventricular   myocardial filling (Grade II diastolic dysfunction).   6. Normal right ventricular size and function.   7. Moderately enlarged left atrium.   8. Mild tricuspid regurgitation.   9. Estimated pulmonary artery systolic pressure is 40.3 mmHg assuming a   right atrial pressure of 8 mmHg, which is consistent with mild pulmonary   hypertension.  10. Dilatation of the ascending aorta.    < end of copied text >      STRESS  FINDINGS:    CATHETERIZATION  FINDINGS:    ELECTROPHYSIOLOGY STUDY  FINDINGS:    CAROTID ULTRASOUND:  FINDINGS    VENOUS DUPLEX SCAN:  FINDINGS:    CHEST CT PULMONARY ANGIO with IV Contrast:  FINDINGS:    MEDICATIONS  (STANDING):  atorvastatin 20 milliGRAM(s) Oral at bedtime  bacitracin   Ointment 1 Application(s) Topical two times a day  diphtheria/tetanus/pertussis (acellular) Vaccine (Adacel) 0.5 milliLiter(s) IntraMuscular once  heparin   Injectable 5000 Unit(s) SubCutaneous every 12 hours  lactated ringers. 1000 milliLiter(s) (75 mL/Hr) IV Continuous <Continuous>    MEDICATIONS  (PRN):  acetaminophen     Tablet .. 650 milliGRAM(s) Oral every 6 hours PRN Temp greater or equal to 38C (100.4F), Mild Pain (1 - 3)  aluminum hydroxide/magnesium hydroxide/simethicone Suspension 30 milliLiter(s) Oral every 4 hours PRN Dyspepsia  melatonin 3 milliGRAM(s) Oral at bedtime PRN Insomnia  ondansetron Injectable 4 milliGRAM(s) IV Push every 8 hours PRN Nausea and/or Vomiting      FAMILY HISTORY: No significant hx    SOCIAL HISTORY: No smoking, ETOH or illicit drug use    Past Surgical History: See above    Allergies:  No Known Allergies      REVIEW OF SYSTEMS:  CONSTITUTIONAL: No fever, weight loss, chills, shakes, or fatigue  RESPIRATORY: No cough, wheezing, hemoptysis, or shortness of breath  CARDIOVASCULAR: No chest pain, dyspnea, palpitations, dizziness, syncope, paroxysmal nocturnal dyspnea, orthopnea, or arm or leg swelling  GASTROINTESTINAL: No abdominal  or epigastric pain, nausea, vomiting, hematemesis, diarrhea, constipation, melena or bright red blood.  NEUROLOGICAL: No headaches, memory loss, slurred speech, limb weakness, loss of strength, numbness, or tremors  MUSCULOSKELETAL: No joint pain or swelling, muscle, back, or extremity pain      Vital Signs Last 24 Hrs  T(C): 36.5 (27 Jul 2024 05:07), Max: 36.6 (26 Jul 2024 20:12)  T(F): 97.7 (27 Jul 2024 05:07), Max: 97.8 (26 Jul 2024 20:12)  HR: 75 (27 Jul 2024 05:07) (61 - 75)  BP: 135/72 (27 Jul 2024 05:07) (129/66 - 135/72)  BP(mean): 93 (27 Jul 2024 05:07) (87 - 93)  RR: 18 (27 Jul 2024 05:07) (18 - 18)  SpO2: 98% (27 Jul 2024 05:07) (98% - 98%)    Parameters below as of 27 Jul 2024 05:07  Patient On (Oxygen Delivery Method): room air        PHYSICAL EXAM:  GENERAL: In no apparent distress, well nourished, and hydrated.  NECK: Supple, No JVD   HEART: Regular rate and rhythm; No murmurs, rubs, or gallops.  PULMONARY: Clear to auscultation and perfusion.  No rales, wheezing, or rhonchi bilaterally.  EXTREMITIES:  2+ Peripheral Pulses, no LE edema BL  NEUROLOGICAL: Grossly nonfocal      INTERPRETATION OF TELEMETRY:    ECG:  < from: 12 Lead ECG (07.25.24 @ 22:33) >  Ventricular Rate 64 BPM    Atrial Rate 64 BPM    P-R Interval 184 ms    QRS Duration 138 ms    Q-T Interval 436 ms    QTC Calculation(Bazett) 449 ms    P Axis 17 degrees    R Axis -62 degrees    T Axis 72 degrees    Diagnosis Line Normal sinus rhythm  Left axis deviation  Right bundle branch block  Abnormal ECG    Confirmed by LOUISE MCKEON MD ([id number]) on 7/26/2024 7:12:40 AM    < end of copied text >      I&O's Detail    26 Jul 2024 07:01  -  27 Jul 2024 07:00  --------------------------------------------------------  IN:    Lactated Ringers: 825 mL    Oral Fluid: 30 mL  Total IN: 855 mL    OUT:    Voided (mL): 350 mL  Total OUT: 350 mL    Total NET: 505 mL      27 Jul 2024 07:01  -  27 Jul 2024 12:53  --------------------------------------------------------  IN:    Oral Fluid: 120 mL  Total IN: 120 mL    OUT:    Voided (mL): 100 mL  Total OUT: 100 mL    Total NET: 20 mL          LABS:                        9.2    9.53  )-----------( 217      ( 27 Jul 2024 07:05 )             28.1     07-27    139  |  106  |  47<H>  ----------------------------<  90  4.3   |  21  |  1.8<H>    Ca    9.2      27 Jul 2024 07:05  Phos  3.1     07-27  Mg     2.1     07-27    TPro  5.6<L>  /  Alb  3.9  /  TBili  1.0  /  DBili  x   /  AST  28  /  ALT  16  /  AlkPhos  56  07-27    CARDIAC MARKERS ( 27 Jul 2024 07:05 )  x     / x     / 964 U/L / x     / x      CARDIAC MARKERS ( 26 Jul 2024 12:17 )  x     / x     / 1154 U/L / x     / x      CARDIAC MARKERS ( 26 Jul 2024 06:29 )  x     / x     / 1109 U/L / x     / x      CARDIAC MARKERS ( 25 Jul 2024 20:03 )  x     / x     / 665 U/L / x     / x            Urinalysis Basic - ( 27 Jul 2024 07:05 )    Color: x / Appearance: x / SG: x / pH: x  Gluc: 90 mg/dL / Ketone: x  / Bili: x / Urobili: x   Blood: x / Protein: x / Nitrite: x   Leuk Esterase: x / RBC: x / WBC x   Sq Epi: x / Non Sq Epi: x / Bacteria: x      BNP  I&O's Detail    26 Jul 2024 07:01  -  27 Jul 2024 07:00  --------------------------------------------------------  IN:    Lactated Ringers: 825 mL    Oral Fluid: 30 mL  Total IN: 855 mL    OUT:    Voided (mL): 350 mL  Total OUT: 350 mL    Total NET: 505 mL      27 Jul 2024 07:01  -  27 Jul 2024 12:53  --------------------------------------------------------  IN:    Oral Fluid: 120 mL  Total IN: 120 mL    OUT:    Voided (mL): 100 mL  Total OUT: 100 mL    Total NET: 20 mL        Daily     Daily     RADIOLOGY & ADDITIONAL STUDIES: Patient is a 89y old  Male who presents with a chief complaint of fall (27 Jul 2024 12:43)    HPI: Patient is a 89 years old male patient with PMHx of HTN, HLD , CKD 3 brought to the ed for the evaluation of the fall. Hx taken from the patient and is aox3 at time of interview. Patient states that at around 9am he was going to the bathroom, and on way he tipped and fell. He does not remember any events after that. Patient is a poor historian. Hx taken form the spouse Yaquelin at the bedsides as well. Spouse states that when she came back from the work at 4pm, she found the patient on floor socked with blood. Patient was unable to get up on her own. She also noted the blood in the bathroom as well. Spouse also states that, patient is independent in activities of his daily life. EP consulted for episode of tachycardia overnight. Patient states he was sleeping and had no symptoms. No prior complaints of palpitations or dizziness. Pt unsure who is cardiologist is.    PAST MEDICAL & SURGICAL HISTORY:  HTN  HLD  CKD3      PREVIOUS DIAGNOSTIC TESTING:      ECHO  FINDINGS:  < from: TTE Echo Complete w/o Contrast w/ Doppler (07.26.24 @ 10:13) >  Summary:   1. Technically difficult study.   2. Normal left ventricular internal cavity size.   3. Normal global left ventricular systolic function.   4. LV Ejection Fraction by Teresa's Method with a biplane EF of 56 %.   5. Spectral Doppler shows pseudonormal pattern of left ventricular   myocardial filling (Grade II diastolic dysfunction).   6. Normal right ventricular size and function.   7. Moderately enlarged left atrium.   8. Mild tricuspid regurgitation.   9. Estimated pulmonary artery systolic pressure is 40.3 mmHg assuming a   right atrial pressure of 8 mmHg, which is consistent with mild pulmonary   hypertension.  10. Dilatation of the ascending aorta.    < end of copied text >      STRESS  FINDINGS:    CATHETERIZATION  FINDINGS:    ELECTROPHYSIOLOGY STUDY  FINDINGS:    CAROTID ULTRASOUND:  FINDINGS    VENOUS DUPLEX SCAN:  FINDINGS:    CHEST CT PULMONARY ANGIO with IV Contrast:  FINDINGS:    MEDICATIONS  (STANDING):  atorvastatin 20 milliGRAM(s) Oral at bedtime  bacitracin   Ointment 1 Application(s) Topical two times a day  diphtheria/tetanus/pertussis (acellular) Vaccine (Adacel) 0.5 milliLiter(s) IntraMuscular once  heparin   Injectable 5000 Unit(s) SubCutaneous every 12 hours  lactated ringers. 1000 milliLiter(s) (75 mL/Hr) IV Continuous <Continuous>    MEDICATIONS  (PRN):  acetaminophen     Tablet .. 650 milliGRAM(s) Oral every 6 hours PRN Temp greater or equal to 38C (100.4F), Mild Pain (1 - 3)  aluminum hydroxide/magnesium hydroxide/simethicone Suspension 30 milliLiter(s) Oral every 4 hours PRN Dyspepsia  melatonin 3 milliGRAM(s) Oral at bedtime PRN Insomnia  ondansetron Injectable 4 milliGRAM(s) IV Push every 8 hours PRN Nausea and/or Vomiting      FAMILY HISTORY: No significant hx    SOCIAL HISTORY: No smoking, ETOH or illicit drug use    Past Surgical History: See above    Allergies:  No Known Allergies      REVIEW OF SYSTEMS:  CONSTITUTIONAL: No fever, weight loss, chills, shakes, or fatigue  RESPIRATORY: No cough, wheezing, hemoptysis, or shortness of breath  CARDIOVASCULAR: No chest pain, dyspnea, palpitations, dizziness, syncope, paroxysmal nocturnal dyspnea, orthopnea, or arm or leg swelling  GASTROINTESTINAL: No abdominal  or epigastric pain, nausea, vomiting, hematemesis, diarrhea, constipation, melena or bright red blood.  NEUROLOGICAL: No headaches, memory loss, slurred speech, limb weakness, loss of strength, numbness, or tremors  MUSCULOSKELETAL: No joint pain or swelling, muscle, back, or extremity pain      Vital Signs Last 24 Hrs  T(C): 36.5 (27 Jul 2024 05:07), Max: 36.6 (26 Jul 2024 20:12)  T(F): 97.7 (27 Jul 2024 05:07), Max: 97.8 (26 Jul 2024 20:12)  HR: 75 (27 Jul 2024 05:07) (61 - 75)  BP: 135/72 (27 Jul 2024 05:07) (129/66 - 135/72)  BP(mean): 93 (27 Jul 2024 05:07) (87 - 93)  RR: 18 (27 Jul 2024 05:07) (18 - 18)  SpO2: 98% (27 Jul 2024 05:07) (98% - 98%)    Parameters below as of 27 Jul 2024 05:07  Patient On (Oxygen Delivery Method): room air        PHYSICAL EXAM:  GENERAL: In no apparent distress, well nourished, and hydrated.  NECK: Supple, No JVD   HEART: Regular rate and rhythm; No murmurs, rubs, or gallops.  PULMONARY: Clear to auscultation and perfusion.  No rales, wheezing, or rhonchi bilaterally.  EXTREMITIES:  2+ Peripheral Pulses, no LE edema BL  NEUROLOGICAL: Grossly nonfocal      INTERPRETATION OF TELEMETRY:    ECG:  < from: 12 Lead ECG (07.25.24 @ 22:33) >  Ventricular Rate 64 BPM    Atrial Rate 64 BPM    P-R Interval 184 ms    QRS Duration 138 ms    Q-T Interval 436 ms    QTC Calculation(Bazett) 449 ms    P Axis 17 degrees    R Axis -62 degrees    T Axis 72 degrees    Diagnosis Line Normal sinus rhythm  Left axis deviation  Right bundle branch block  Abnormal ECG    Confirmed by LOUISE MCKEON MD ([id number]) on 7/26/2024 7:12:40 AM    < end of copied text >      I&O's Detail    26 Jul 2024 07:01  -  27 Jul 2024 07:00  --------------------------------------------------------  IN:    Lactated Ringers: 825 mL    Oral Fluid: 30 mL  Total IN: 855 mL    OUT:    Voided (mL): 350 mL  Total OUT: 350 mL    Total NET: 505 mL      27 Jul 2024 07:01  -  27 Jul 2024 12:53  --------------------------------------------------------  IN:    Oral Fluid: 120 mL  Total IN: 120 mL    OUT:    Voided (mL): 100 mL  Total OUT: 100 mL    Total NET: 20 mL          LABS:                        9.2    9.53  )-----------( 217      ( 27 Jul 2024 07:05 )             28.1     07-27    139  |  106  |  47<H>  ----------------------------<  90  4.3   |  21  |  1.8<H>    Ca    9.2      27 Jul 2024 07:05  Phos  3.1     07-27  Mg     2.1     07-27    TPro  5.6<L>  /  Alb  3.9  /  TBili  1.0  /  DBili  x   /  AST  28  /  ALT  16  /  AlkPhos  56  07-27    CARDIAC MARKERS ( 27 Jul 2024 07:05 )  x     / x     / 964 U/L / x     / x      CARDIAC MARKERS ( 26 Jul 2024 12:17 )  x     / x     / 1154 U/L / x     / x      CARDIAC MARKERS ( 26 Jul 2024 06:29 )  x     / x     / 1109 U/L / x     / x      CARDIAC MARKERS ( 25 Jul 2024 20:03 )  x     / x     / 665 U/L / x     / x            Urinalysis Basic - ( 27 Jul 2024 07:05 )    Color: x / Appearance: x / SG: x / pH: x  Gluc: 90 mg/dL / Ketone: x  / Bili: x / Urobili: x   Blood: x / Protein: x / Nitrite: x   Leuk Esterase: x / RBC: x / WBC x   Sq Epi: x / Non Sq Epi: x / Bacteria: x      BNP  I&O's Detail    26 Jul 2024 07:01  -  27 Jul 2024 07:00  --------------------------------------------------------  IN:    Lactated Ringers: 825 mL    Oral Fluid: 30 mL  Total IN: 855 mL    OUT:    Voided (mL): 350 mL  Total OUT: 350 mL    Total NET: 505 mL      27 Jul 2024 07:01  -  27 Jul 2024 12:53  --------------------------------------------------------  IN:    Oral Fluid: 120 mL  Total IN: 120 mL    OUT:    Voided (mL): 100 mL  Total OUT: 100 mL    Total NET: 20 mL        Daily     Daily     RADIOLOGY & ADDITIONAL STUDIES:

## 2024-07-27 NOTE — CONSULT NOTE ADULT - ASSESSMENT
89 years old male patient with history of HTN, HLD , CKD 3 was brought to ED after unwitnessed fall and trauma to head and left arm, patient was going to the bathroom, and on way he tipped and fell. He denies loss of consciousness. Found to have episode of SVT overnight    Impression:  Fall, mechanical  Head Trauma  HTN  HLD  CKD  Paroxysmal SVT    Plan:  - Will discuss with attending 89 years old male patient with history of HTN, HLD , CKD 3 was brought to ED after unwitnessed fall and trauma to head and left arm, patient was going to the bathroom, and on way he tipped and fell. He denies loss of consciousness. Found to have episode of tachycardia overnight    Impression:  Sinus Tachycardia  Fall, mechanical  Head Trauma  HTN  HLD  CKD      Plan:  - No SVT or AF noted  - No EP intervention needed at this time  - Please recall as needed [id number]

## 2024-07-27 NOTE — CONSULT NOTE ADULT - NS ATTEND AMEND GEN_ALL_CORE FT
SVT    Asymptomatic + Brief, non-sustained  [facility]  [facility]  Echo, ekg, tele reviewed, independently interpreted nd d/w primary team  No further w-up  No routine f-up needed

## 2024-07-28 VITALS
RESPIRATION RATE: 18 BRPM | SYSTOLIC BLOOD PRESSURE: 138 MMHG | OXYGEN SATURATION: 100 % | DIASTOLIC BLOOD PRESSURE: 71 MMHG | HEART RATE: 59 BPM

## 2024-07-28 PROCEDURE — 99239 HOSP IP/OBS DSCHRG MGMT >30: CPT

## 2024-07-28 RX ORDER — BENAZEPRIL HYDROCHLORIDE AND HYDROCHLOROTHIAZIDE 20; 25 MG/1; MG/1
1 TABLET, FILM COATED ORAL
Refills: 0 | DISCHARGE

## 2024-07-28 RX ORDER — BACITRACIN 500 UNIT/G
1 OINTMENT (GRAM) TOPICAL
Qty: 1 | Refills: 0
Start: 2024-07-28

## 2024-07-28 RX ADMIN — Medication 1 APPLICATION(S): at 05:33

## 2024-07-28 RX ADMIN — HEPARIN SODIUM 5000 UNIT(S): 1000 INJECTION, SOLUTION INTRAVENOUS; SUBCUTANEOUS at 05:34

## 2024-07-28 NOTE — DISCHARGE NOTE PROVIDER - NSDCCPCAREPLAN_GEN_ALL_CORE_FT
PRINCIPAL DISCHARGE DIAGNOSIS  Diagnosis: Unwitnessed fall  Assessment and Plan of Treatment:       SECONDARY DISCHARGE DIAGNOSES  Diagnosis: AILYN (acute kidney injury)  Assessment and Plan of Treatment:     Diagnosis: Elevated troponin  Assessment and Plan of Treatment:     Diagnosis: Multiple lacerations  Assessment and Plan of Treatment:

## 2024-07-28 NOTE — DISCHARGE NOTE PROVIDER - ATTENDING DISCHARGE PHYSICAL EXAMINATION:
PHYSICAL EXAM:  GENERAL: NAD, well-developed.  HEAD:  small abrasion on left face.   EYES: EOMI, PERRLA, conjunctiva and sclera clear.  NECK: Supple, No JVD.  CHEST/LUNG: Clear to auscultation bilaterally; No wheeze.  HEART: Regular rate and rhythm; S1 S2.   ABDOMEN: Soft, Nontender, Nondistended; Bowel sounds present.  EXTREMITIES:  2+ Peripheral Pulses, No clubbing, cyanosis, or edema.  PSYCH: AAOx3.  NEUROLOGY: non-focal.  SKIN: left arm with  3 large laceration with multiple sutures and hematoma.

## 2024-07-28 NOTE — DISCHARGE NOTE PROVIDER - NSDCFUSCHEDAPPT_GEN_ALL_CORE_FT
[facility]  [id number]  Scheduled Appointment: 08/23/2024

## 2024-07-28 NOTE — DISCHARGE NOTE PROVIDER - HOSPITAL COURSE
89 years old male patient with history of HTN, HLD , CKD 3 was brought to ED after unwitnessed fall and trauma to head and left arm, patient was going to the bathroom, and on way he tipped and fell. He denies loss of consciousness, Patient was feeling weak and unable to get up, in the ED vital signs were stable, he was noted with multiple lacerations of left arm and small abrasion on th left face. Labs showed Cr 1.8, Troponin 64, , EKG showed RBBB and LAFB, CT head , cervical spine, chest and abdomen showed no acute traumatic injury. Patient was admitted to Telemetry, started on IV fluid, Cr was stable, CPK increased to 1109 then started to improve, patient was seen by PT, patient is stable, will discharge home, advised to follow up with his PCP.     A/P:   Unwitnessed Fall:   Facial and left arm trauma:   Patient denies syncope, felt dizzy when he stood up, possibly orthostatic changes.   EKG showed bifascicular block, RBBB and LAFB,   Troponin 64>62>89, no chest pain, likely NSTEMI type 2 from fall, >1109>964  CT chest/abdomen/pelvis: IMPRESSION: No current CT evidence of acute traumatic injury to the chest and abdomen. Sigmoid diverticulosis. Extensive coronary artery calcifications  CT head/cervical spine: no acute pathology  orthostatic BP negative, hold Benazepril and HCTZ.  Echo showed normal LVEF 55%  Tele showed episode of SVT vs AFIB for >10 minutes, EP consulted, recommended no further work up.   Call his PCP for suture removal on Friday, or visit the ED for suture removal . Wound care with bacitracin.     Acute Kidney Injury vs CKD3b:   Cr 1.8, monitor Cr after IV contrast  s/p IV fluid, Cr is stable, CPK is trending down, encourage oral feed.     HTN: Hold BP as above.

## 2024-07-28 NOTE — DISCHARGE NOTE PROVIDER - NSDCMRMEDTOKEN_GEN_ALL_CORE_FT
bacitracin 500 units/g topical ointment: Apply topically to affected area 3 times a day 1 Apply topically to affected area 2 times a day  Lipitor 20 mg oral tablet: 1 tab(s) orally once a day

## 2024-07-28 NOTE — PROGRESS NOTE ADULT - ASSESSMENT
89 years old male patient with PMHx of HTN, HLD , CKD 3 brought to the ed for the evaluation of the fall. Hx taken from the patient and is aox3 at time of interview. Patient states that at around 9am he was going to the bathroom, and on way he tipped and fell. He does not remember any events after that. Patient is a poor historian. Hx taken form the spouse Yaquelin at the bedsides as well. Spouse states that when she came back from the work at 4pm, she found the patient on floor socked with blood. Patient was unable to get up on her own. She also noted the blood in the bathroom as well. Spouse also states that, patient is independent in activities of his daily life.       #Unwitnessed Fall with laceration to left arm   CT chest/abdomen/pelvis: IMPRESSION: No current CT evidence of acute traumatic injury to the chest and abdomen. Sigmoid diverticulosis. Extensive coronary artery calcifications  CT head/cervical spine: no acute pathology  - s/p laceration repair on left arm in ed   orthostatics neg   PT: Home PT     #Elevated trops   #Elevated CPK  -->1109-->964  Troponin 64-->62-->89,  Echo: EF 56%, DIIDD, mild pHTB  TSH WNL   -fu  eeg results  EP: no intervention at this time       #AILYN in CKDIII  - unknown baseline creat.   -Cr remaining steady at 1.8 s/p fluids. Will continue to monitor   - avoid nephrotoxic drugs  - adjust meds for egfr       #HLD  #HTN  - Hold benazapril due to ailyn  - hold  hydrochlorothiazide due to ailyn   - cw Lipitor       DVT PPX: scd  GI PPX: none   DIET: dash   ACTIVITY:   CODE STATUS: full   DISPOSITION: Possible d/c home today

## 2024-07-28 NOTE — PROGRESS NOTE ADULT - SUBJECTIVE AND OBJECTIVE BOX
SUBJECTIVE/OVERNIGHT EVENTS  Today is hospital day 3d. This morning patient was seen and examined at bedside, resting comfortably in bed. No acute or major events overnight.    CODE STATUS: full code     FAMILY COMMUNICATION  Contact date:  Name of person contacted:  Relationship to patient:  Communication details:    MEDICATIONS  STANDING MEDICATIONS  atorvastatin 20 milliGRAM(s) Oral at bedtime  bacitracin   Ointment 1 Application(s) Topical two times a day  diphtheria/tetanus/pertussis (acellular) Vaccine (Adacel) 0.5 milliLiter(s) IntraMuscular once  heparin   Injectable 5000 Unit(s) SubCutaneous every 12 hours  lactated ringers. 1000 milliLiter(s) IV Continuous <Continuous>    PRN MEDICATIONS  acetaminophen     Tablet .. 650 milliGRAM(s) Oral every 6 hours PRN  aluminum hydroxide/magnesium hydroxide/simethicone Suspension 30 milliLiter(s) Oral every 4 hours PRN  melatonin 3 milliGRAM(s) Oral at bedtime PRN  ondansetron Injectable 4 milliGRAM(s) IV Push every 8 hours PRN    VITALS  T(F): 97.8 (07-27-24 @ 20:08), Max: 97.8 (07-27-24 @ 20:08)  HR: 66 (07-27-24 @ 20:08) (49 - 75)  BP: 146/74 (07-27-24 @ 20:08) (135/72 - 149/81)  RR: 20 (07-27-24 @ 13:09) (18 - 20)  SpO2: 98% (07-27-24 @ 15:30) (98% - 98%)    PHYSICAL EXAM  GENERAL  ( x ) NAD, lying in bed comfortably     (  ) obtunded     (  ) lethargic     (  ) somnolent    HEAD  ( x ) Atraumatic     (  ) hematoma     (  ) laceration (specify location:       )     NECK  ( x ) Supple     (  ) neck stiffness     (  ) nuchal rigidity     (  )  no JVD     (  ) JVD present ( -- cm)    HEART  Rate -->  (x  ) normal rate    (  ) bradycardic    (  ) tachycardic  Rhythm -->  ( x ) regular    (  ) regularly irregular    (  ) irregularly irregular  Murmurs -->  ( x ) normal s1/s2    (  ) systolic murmur    (  ) diastolic murmur    (  ) continuous murmur     (  ) S3 present    (  ) S4 present    LUNGS  ( x )Unlabored respirations     (  ) tachypnea  (x  ) B/L air entry     (  ) decreased breath sounds in:  (location     )    (x  ) no adventitious sound     (  ) crackles     (  ) wheezing      (  ) rhonchi      (specify location:       )  (  ) chest wall tenderness (specify location:       )    ABDOMEN  ( x ) Soft     (  ) tense   |   ( x ) nondistended     (  ) distended   |   (  ) +BS     (  ) hypoactive bowel sounds     (  ) hyperactive bowel sounds  (x  ) nontender     (  ) RUQ tenderness     (  ) RLQ tenderness     (  ) LLQ tenderness     (  ) epigastric tenderness     (  ) diffuse tenderness  (  ) Splenomegaly      (  ) Hepatomegaly      (  ) Jaundice     (  ) ecchymosis     EXTREMITIES  (  ) Normal     (  ) Rash     (  ) ecchymosis     (  ) varicose veins      (  ) pitting edema     (  ) non-pitting edema   (  ) ulceration     (  ) gangrene:     (location:   lacteration on L arm   )    NERVOUS SYSTEM  (x  ) A&Ox3     (  ) confused     (  ) lethargic  CN II-XII:     ( x ) Intact     (  ) focal deficits  (Specify:     )   Upper extremities:     (  ) strength X/5     (  ) focal deficit (specify:    )  Lower extremities:     (  ) strength  X/5    (  ) focal deficit (specify:    )    SKIN  (  ) No rashes or lesions     (  ) maculopapular rash     (  ) pustules     (  ) vesicles     (  ) ulcer     (  ) ecchymosis     (specify location: Lacerations on L arm and L forehead      )    (  ) Indwelling Leonard Catheter   Date insterted:    Reason (  ) Critical illness     (  ) urinary retention    (  ) Accurate Ins/Outs Monitoring     (  ) CMO patient    (  ) Central Line  Date inserted:  Location: (  ) Right IJ   (  ) Left IJ   (  ) Right Fem   (  ) Left Fem    (  ) SPC  (  ) pigtail  (  ) PEG tube  (  ) colostomy  (  ) jejunostomy  (  ) U-Dall    LABS             9.2    9.53  )-----------( 217      ( 07-27-24 @ 07:05 )             28.1     139  |  106  |  47  -------------------------<  90   07-27-24 @ 07:05  4.3  |  21  |  1.8    Ca      9.2     07-27-24 @ 07:05  Phos   3.1     07-27-24 @ 07:05  Mg     2.1     07-27-24 @ 07:05    TPro  5.6  /  Alb  3.9  /  TBili  1.0  /  DBili  x   /  AST  28  /  ALT  16  /  AlkPhos  56  /  GGT  x     07-27-24 @ 07:05      Troponin T, High Sensitivity Result: 89 ng/L (07-26-24 @ 06:29)  Troponin T, High Sensitivity Result: 62 ng/L (07-25-24 @ 23:35)  Troponin T, High Sensitivity Result: 64 ng/L (07-25-24 @ 17:36)    Urinalysis Basic - ( 27 Jul 2024 07:05 )    Color: x / Appearance: x / SG: x / pH: x  Gluc: 90 mg/dL / Ketone: x  / Bili: x / Urobili: x   Blood: x / Protein: x / Nitrite: x   Leuk Esterase: x / RBC: x / WBC x   Sq Epi: x / Non Sq Epi: x / Bacteria: x          IMAGING
  JESÚS JACOBO  89y  Male      Patient is a 89y old  Male who presents with a chief complaint of fall (26 Jul 2024 03:16)      INTERVAL HPI/OVERNIGHT EVENTS:  he feels ok, he wants to go home today.   Vital Signs Last 24 Hrs  T(C): 36.5 (27 Jul 2024 05:07), Max: 36.6 (26 Jul 2024 20:12)  T(F): 97.7 (27 Jul 2024 05:07), Max: 97.8 (26 Jul 2024 20:12)  HR: 75 (27 Jul 2024 05:07) (61 - 75)  BP: 135/72 (27 Jul 2024 05:07) (129/66 - 135/72)  BP(mean): 93 (27 Jul 2024 05:07) (87 - 93)  RR: 18 (27 Jul 2024 05:07) (18 - 18)  SpO2: 98% (27 Jul 2024 05:07) (98% - 98%)    Parameters below as of 27 Jul 2024 05:07  Patient On (Oxygen Delivery Method): room air          07-26-24 @ 07:01 - 07-27-24 @ 07:00  --------------------------------------------------------  IN: 855 mL / OUT: 350 mL / NET: 505 mL    07-27-24 @ 07:01 - 07-27-24 @ 12:51  --------------------------------------------------------  IN: 120 mL / OUT: 100 mL / NET: 20 mL            Consultant(s) Notes Reviewed:  [x ] YES  [ ] NO          MEDICATIONS  (STANDING):  atorvastatin 20 milliGRAM(s) Oral at bedtime  bacitracin   Ointment 1 Application(s) Topical two times a day  diphtheria/tetanus/pertussis (acellular) Vaccine (Adacel) 0.5 milliLiter(s) IntraMuscular once  heparin   Injectable 5000 Unit(s) SubCutaneous every 12 hours  lactated ringers. 1000 milliLiter(s) (75 mL/Hr) IV Continuous <Continuous>    MEDICATIONS  (PRN):  acetaminophen     Tablet .. 650 milliGRAM(s) Oral every 6 hours PRN Temp greater or equal to 38C (100.4F), Mild Pain (1 - 3)  aluminum hydroxide/magnesium hydroxide/simethicone Suspension 30 milliLiter(s) Oral every 4 hours PRN Dyspepsia  melatonin 3 milliGRAM(s) Oral at bedtime PRN Insomnia  ondansetron Injectable 4 milliGRAM(s) IV Push every 8 hours PRN Nausea and/or Vomiting      LABS                          9.2    9.53  )-----------( 217      ( 27 Jul 2024 07:05 )             28.1     07-27    139  |  106  |  47<H>  ----------------------------<  90  4.3   |  21  |  1.8<H>    Ca    9.2      27 Jul 2024 07:05  Phos  3.1     07-27  Mg     2.1     07-27    TPro  5.6<L>  /  Alb  3.9  /  TBili  1.0  /  DBili  x   /  AST  28  /  ALT  16  /  AlkPhos  56  07-27      Urinalysis Basic - ( 27 Jul 2024 07:05 )    Color: x / Appearance: x / SG: x / pH: x  Gluc: 90 mg/dL / Ketone: x  / Bili: x / Urobili: x   Blood: x / Protein: x / Nitrite: x   Leuk Esterase: x / RBC: x / WBC x   Sq Epi: x / Non Sq Epi: x / Bacteria: x        Lactate Trend    CARDIAC MARKERS ( 27 Jul 2024 07:05 )  x     / x     / 964 U/L / x     / x      CARDIAC MARKERS ( 26 Jul 2024 12:17 )  x     / x     / 1154 U/L / x     / x      CARDIAC MARKERS ( 26 Jul 2024 06:29 )  x     / x     / 1109 U/L / x     / x      CARDIAC MARKERS ( 25 Jul 2024 20:03 )  x     / x     / 665 U/L / x     / x          CAPILLARY BLOOD GLUCOSE            RADIOLOGY & ADDITIONAL TESTS:    Imaging Personally Reviewed:  [ ] YES  [ ] NO    HEALTH ISSUES - PROBLEM Dx:          PHYSICAL EXAM:  GENERAL: NAD, well-developed.  HEAD:  small abrasion on left face.   EYES: EOMI, PERRLA, conjunctiva and sclera clear.  NECK: Supple, No JVD.  CHEST/LUNG: Clear to auscultation bilaterally; No wheeze.  HEART: Regular rate and rhythm; S1 S2.   ABDOMEN: Soft, Nontender, Nondistended; Bowel sounds present.  EXTREMITIES:  2+ Peripheral Pulses, No clubbing, cyanosis, or edema.  PSYCH: AAOx3.  NEUROLOGY: non-focal.  SKIN: left arm with  3 large laceration with multiple sutures and hematoma.

## 2024-07-28 NOTE — DISCHARGE NOTE PROVIDER - PREFACE STATEMENT FOR MINUTES SPENT
Spoke with lissette and scheduled aaron  follow up appointment for 08/15/2024 @ 9:20 lissette agreed   I personally spent

## 2024-08-03 ENCOUNTER — EMERGENCY (EMERGENCY)
Facility: HOSPITAL | Age: 88
LOS: 0 days | Discharge: HOME | End: 2024-08-03
Attending: EMERGENCY MEDICINE
Payer: MEDICARE

## 2024-08-03 VITALS
SYSTOLIC BLOOD PRESSURE: 117 MMHG | DIASTOLIC BLOOD PRESSURE: 55 MMHG | WEIGHT: 199.96 LBS | RESPIRATION RATE: 18 BRPM | HEART RATE: 62 BPM | OXYGEN SATURATION: 95 % | TEMPERATURE: 98 F

## 2024-08-03 DIAGNOSIS — S51.812D LACERATION WITHOUT FOREIGN BODY OF LEFT FOREARM, SUBSEQUENT ENCOUNTER: ICD-10-CM

## 2024-08-03 PROCEDURE — 99212 OFFICE O/P EST SF 10 MIN: CPT

## 2024-08-03 PROCEDURE — L9995: CPT

## 2024-08-11 ENCOUNTER — EMERGENCY (EMERGENCY)
Facility: HOSPITAL | Age: 89
LOS: 0 days | Discharge: HOME | End: 2024-08-11
Attending: STUDENT IN AN ORGANIZED HEALTH CARE EDUCATION/TRAINING PROGRAM
Payer: MEDICARE

## 2024-08-11 VITALS
HEART RATE: 56 BPM | DIASTOLIC BLOOD PRESSURE: 62 MMHG | RESPIRATION RATE: 18 BRPM | OXYGEN SATURATION: 98 % | SYSTOLIC BLOOD PRESSURE: 113 MMHG | TEMPERATURE: 98 F

## 2024-08-11 DIAGNOSIS — R42 DIZZINESS AND GIDDINESS: ICD-10-CM

## 2024-08-11 DIAGNOSIS — N18.9 CHRONIC KIDNEY DISEASE, UNSPECIFIED: ICD-10-CM

## 2024-08-11 DIAGNOSIS — I12.9 HYPERTENSIVE CHRONIC KIDNEY DISEASE WITH STAGE 1 THROUGH STAGE 4 CHRONIC KIDNEY DISEASE, OR UNSPECIFIED CHRONIC KIDNEY DISEASE: ICD-10-CM

## 2024-08-11 DIAGNOSIS — E78.5 HYPERLIPIDEMIA, UNSPECIFIED: ICD-10-CM

## 2024-08-11 PROCEDURE — 99283 EMERGENCY DEPT VISIT LOW MDM: CPT | Mod: 25

## 2024-08-11 PROCEDURE — 82962 GLUCOSE BLOOD TEST: CPT

## 2024-08-11 PROCEDURE — 93005 ELECTROCARDIOGRAM TRACING: CPT

## 2024-08-11 PROCEDURE — 93010 ELECTROCARDIOGRAM REPORT: CPT

## 2024-08-11 PROCEDURE — 99284 EMERGENCY DEPT VISIT MOD MDM: CPT | Mod: GC

## 2024-08-11 RX ORDER — MECLIZINE 12.5 MG/1
1 TABLET ORAL
Qty: 21 | Refills: 0
Start: 2024-08-11 | End: 2024-08-17

## 2024-08-11 RX ORDER — MECLIZINE 12.5 MG/1
50 TABLET ORAL ONCE
Refills: 0 | Status: COMPLETED | OUTPATIENT
Start: 2024-08-11 | End: 2024-08-11

## 2024-08-11 RX ORDER — MECLIZINE 12.5 MG/1
1 TABLET ORAL
Qty: 14 | Refills: 0
Start: 2024-08-11 | End: 2024-08-17

## 2024-08-11 RX ORDER — METOCLOPRAMIDE HCL 5 MG/ML
10 VIAL (ML) INJECTION ONCE
Refills: 0 | Status: COMPLETED | OUTPATIENT
Start: 2024-08-11 | End: 2024-08-11

## 2024-08-11 RX ADMIN — MECLIZINE 50 MILLIGRAM(S): 12.5 TABLET ORAL at 13:23

## 2024-08-11 RX ADMIN — Medication 10 MILLIGRAM(S): at 13:23

## 2024-08-11 NOTE — ED PROVIDER NOTE - PATIENT PORTAL LINK FT
You can access the FollowMyHealth Patient Portal offered by [facility] by registering at the following website: [email or web address]. By joining [email or web address]’s FollowMyHealth portal, you will also be able to view your health information using other applications (apps) compatible with our system.

## 2024-08-11 NOTE — ED PROVIDER NOTE - OBJECTIVE STATEMENT
89-year-old male, PMH HTN, CKD, HLD, vertigo, presents with vertigo, this morning while sitting up.  Patient has similar symptoms 2-3 times a week also when rising in the morning,, except this time symptoms were longer lasting.  No blurry vision, slurred speech weakness or numbness.  No chest pain or shortness of breath.  No recent medication changes.

## 2024-08-11 NOTE — ED PROVIDER NOTE - PROGRESS NOTE DETAILS
BT: patient improves feeling better after medication. was able to ambulate. will dc home with meds to pharm. Has appt with ENT already

## 2024-08-11 NOTE — ED PROVIDER NOTE - CLINICAL SUMMARY MEDICAL DECISION MAKING FREE TEXT BOX
.    Patient with vertigo.  Vertigo similar to prior.    Additional information taken from Rhoda at the bedside.    Exam as noted above.    Differential diagnose considered includes but not limited to peripheral vertigo, dehydration, electrode abnormality, central vertigo.  However, given HPI, exam, and patient familiarity with symptoms, most likely etiology is peripheral vertigo.    Patient fell much better after medications, continues to have nonfocal neurologic exam.    . .    Patient with vertigo.  Vertigo similar to prior.    Additional information taken from Rhoda at the bedside.    Exam as noted above.    Differential diagnose considered includes but not limited to peripheral vertigo, dehydration, electrode abnormality, central vertigo.  However, given HPI, exam, and patient familiarity with symptoms, most likely etiology is peripheral vertigo.    Patient fell much better after medications, continues to have nonfocal neurologic exam.    EKG Nl sinus, NO STEMI    IMP: Vertigo,   Meclizine sent to pharmacy  Pt stable for dc w/ outpt f/up, and care as discussed.  Pt understands plan and signs and symptoms for ED return. DC home.

## 2024-08-11 NOTE — ED PROVIDER NOTE - NSFOLLOWUPINSTRUCTIONS_ED_ALL_ED_FT
You have been seen for dizziness/vertigo.  In the emergency department your symptoms completely resolved.  You have a normal neurologic exam.  You walk to the emergency department without distress.  You are safe for discharge.  Take medications as prescribed.  Follow-up with your doctor in 3 to 5 days if your symptoms do not improve.  Stable hydrated.  When getting up from bed make sure you take your time to sit at the edge of the bed before standing.  When you stand stand slowly.  For new or worsening symptoms return to the emergency department.

## 2024-08-11 NOTE — ED ADULT NURSE NOTE - CHIEF COMPLAINT QUOTE
Patient complaining of dizziness that started  " a while ago" diagnosed with vertigo by PMD and prescribed medication. pt states he ran out of meds. denies vision changes, nausea, vomiting

## 2024-08-11 NOTE — ED ADULT TRIAGE NOTE - CHIEF COMPLAINT QUOTE
Dr. Tyler, please review:    Labs- RDW-SD (47.5), Neutrophil absolute (8.62), Lymphocyte absolute (0.36), FBS (105), A1C (6.7), MRSA neg, creatinine (1.55), BUN/Creat ratio (7.1), GFR (58), ALT (69), AST (80), 30 protein in urine, all other labs WNL    EKG- sinus tachycardia, possible left atrial enlargement. No previous EKG.    CXR- WNL    OK for surgery?  
He may proceed to surgery  
Lumbar Discectomy on 02/23/24 with Dr. Ervin @ [facility]    H&P- completed 02/202/24  Labs- RDW-SD (47.5), Neutrophil absolute (8.62), Lymphocyte absolute (0.36), FBS (105), A1C (6.7), MRSA neg, creatinine (1.55), BUN/Creat ratio (7.1), GFR (58), ALT (69), AST (80), 30 protein in urine, all other labs WNL  EKG- sinus tachycardia, possible left atrial enlargement. No previous EKG.  CXR- WNL    
Spoke to patient, went over test results and let him know he is clear for surgery per Dr. Tyler. Patient made aware his A1C is 6.7 and he should watch his diet and speak to his PCP at some point soon post-op to manage his diabetes.    Pre-Op packet faxed to [facility].  
Patient complaining of dizziness that started  " a while ago" diagnosed with vertigo by PMD and prescribed medication. pt states he ran out of meds. denies vision changes, nausea, vomiting

## 2024-08-11 NOTE — ED ADULT NURSE NOTE - NSFALLHARMRISKINTERV_ED_ALL_ED

## 2024-08-11 NOTE — ED ADULT TRIAGE NOTE - CCCP TRG CHIEF CMPLNT
Patient states that she was recently treated for bacterial vaginosis and developed a yeast infection from the antibiotics. dizziness

## 2024-08-11 NOTE — ED PROVIDER NOTE - PHYSICAL EXAMINATION
Gen: NAD  Head: NCAT  ENT: MMM  Eyes: NL inspection, PERRLA, painless EOMI, no nystagmus no skew deviation  Neck: supple  Cardio: RRR  Pulm: No resp distress, CTAB  Abd: S/NT no R/G  Extrem: No pedal edema  Neuro: Cranial nerves WNL, no cerebellar signs, motor and sensory grossly normal throughout.  Patient has normal gait with walker no dizziness or vertigo during examination.  Psyche: cooperative

## 2024-08-12 DIAGNOSIS — Z87.891 PERSONAL HISTORY OF NICOTINE DEPENDENCE: ICD-10-CM

## 2024-08-12 DIAGNOSIS — N18.30 CHRONIC KIDNEY DISEASE, STAGE 3 UNSPECIFIED: ICD-10-CM

## 2024-08-12 DIAGNOSIS — I21.A1 MYOCARDIAL INFARCTION TYPE 2: ICD-10-CM

## 2024-08-12 DIAGNOSIS — R55 SYNCOPE AND COLLAPSE: ICD-10-CM

## 2024-08-12 DIAGNOSIS — S01.81XA LACERATION WITHOUT FOREIGN BODY OF OTHER PART OF HEAD, INITIAL ENCOUNTER: ICD-10-CM

## 2024-08-12 DIAGNOSIS — N17.9 ACUTE KIDNEY FAILURE, UNSPECIFIED: ICD-10-CM
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
## 2024-08-12 DIAGNOSIS — F03.90 UNSPECIFIED DEMENTIA, UNSPECIFIED SEVERITY, WITHOUT BEHAVIORAL DISTURBANCE, PSYCHOTIC DISTURBANCE, MOOD DISTURBANCE, AND ANXIETY: ICD-10-CM
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
## 2024-08-12 DIAGNOSIS — I47.10 SUPRAVENTRICULAR TACHYCARDIA, UNSPECIFIED: ICD-10-CM

## 2024-08-12 DIAGNOSIS — S51.812A LACERATION WITHOUT FOREIGN BODY OF LEFT FOREARM, INITIAL ENCOUNTER: ICD-10-CM

## 2024-08-12 DIAGNOSIS — I12.9 HYPERTENSIVE CHRONIC KIDNEY DISEASE WITH STAGE 1 THROUGH STAGE 4 CHRONIC KIDNEY DISEASE, OR UNSPECIFIED CHRONIC KIDNEY DISEASE: ICD-10-CM

## 2024-08-23 ENCOUNTER — APPOINTMENT (OUTPATIENT)
Dept: OTOLARYNGOLOGY | Facility: CLINIC | Age: 89
End: 2024-08-23
Payer: MEDICARE

## 2024-08-23 PROCEDURE — V5299A: CUSTOM

## 2025-01-16 ENCOUNTER — INPATIENT (INPATIENT)
Facility: HOSPITAL | Age: 89
LOS: 6 days | Discharge: NURSING, REHAB OR LONG-TERM CARE | DRG: 948 | End: 2025-01-23
Attending: INTERNAL MEDICINE | Admitting: HOSPITALIST
Payer: MEDICARE

## 2025-01-16 VITALS
SYSTOLIC BLOOD PRESSURE: 165 MMHG | WEIGHT: 201.06 LBS | RESPIRATION RATE: 19 BRPM | OXYGEN SATURATION: 100 % | HEART RATE: 63 BPM | HEIGHT: 68 IN | DIASTOLIC BLOOD PRESSURE: 76 MMHG | TEMPERATURE: 98 F

## 2025-01-16 DIAGNOSIS — R41.82 ALTERED MENTAL STATUS, UNSPECIFIED: ICD-10-CM

## 2025-01-16 LAB
ANION GAP SERPL CALC-SCNC: 9 MMOL/L — SIGNIFICANT CHANGE UP (ref 7–14)
BASE EXCESS BLDV CALC-SCNC: -4.5 MMOL/L — LOW (ref -2–3)
BASOPHILS # BLD AUTO: 0.04 K/UL — SIGNIFICANT CHANGE UP (ref 0–0.2)
BASOPHILS NFR BLD AUTO: 0.5 % — SIGNIFICANT CHANGE UP (ref 0–1)
BUN SERPL-MCNC: 38 MG/DL — HIGH (ref 10–20)
CA-I SERPL-SCNC: 1.28 MMOL/L — SIGNIFICANT CHANGE UP (ref 1.15–1.33)
CALCIUM SERPL-MCNC: 9.9 MG/DL — SIGNIFICANT CHANGE UP (ref 8.4–10.4)
CHLORIDE SERPL-SCNC: 104 MMOL/L — SIGNIFICANT CHANGE UP (ref 98–110)
CO2 SERPL-SCNC: 21 MMOL/L — SIGNIFICANT CHANGE UP (ref 17–32)
CREAT SERPL-MCNC: 2 MG/DL — HIGH (ref 0.7–1.5)
EGFR: 31 ML/MIN/1.73M2 — LOW
EOSINOPHIL # BLD AUTO: 0.33 K/UL — SIGNIFICANT CHANGE UP (ref 0–0.7)
EOSINOPHIL NFR BLD AUTO: 4.3 % — SIGNIFICANT CHANGE UP (ref 0–8)
GAS PNL BLDV: 132 MMOL/L — LOW (ref 136–145)
GAS PNL BLDV: SIGNIFICANT CHANGE UP
GAS PNL BLDV: SIGNIFICANT CHANGE UP
GLUCOSE SERPL-MCNC: 96 MG/DL — SIGNIFICANT CHANGE UP (ref 70–99)
HCO3 BLDV-SCNC: 20 MMOL/L — LOW (ref 22–29)
HCT VFR BLD CALC: 36.7 % — LOW (ref 42–52)
HCT VFR BLDA CALC: 36 % — LOW (ref 39–51)
HGB BLD CALC-MCNC: 11.9 G/DL — LOW (ref 12.6–17.4)
HGB BLD-MCNC: 11.7 G/DL — LOW (ref 14–18)
IMM GRANULOCYTES NFR BLD AUTO: 0.4 % — HIGH (ref 0.1–0.3)
LACTATE BLDV-MCNC: 1.4 MMOL/L — SIGNIFICANT CHANGE UP (ref 0.5–2)
LYMPHOCYTES # BLD AUTO: 1.17 K/UL — LOW (ref 1.2–3.4)
LYMPHOCYTES # BLD AUTO: 15.3 % — LOW (ref 20.5–51.1)
MCHC RBC-ENTMCNC: 30.1 PG — SIGNIFICANT CHANGE UP (ref 27–31)
MCHC RBC-ENTMCNC: 31.9 G/DL — LOW (ref 32–37)
MCV RBC AUTO: 94.3 FL — HIGH (ref 80–94)
MONOCYTES # BLD AUTO: 0.82 K/UL — HIGH (ref 0.1–0.6)
MONOCYTES NFR BLD AUTO: 10.7 % — HIGH (ref 1.7–9.3)
NEUTROPHILS # BLD AUTO: 5.25 K/UL — SIGNIFICANT CHANGE UP (ref 1.4–6.5)
NEUTROPHILS NFR BLD AUTO: 68.8 % — SIGNIFICANT CHANGE UP (ref 42.2–75.2)
NRBC # BLD: 0 /100 WBCS — SIGNIFICANT CHANGE UP (ref 0–0)
NRBC BLD-RTO: 0 /100 WBCS — SIGNIFICANT CHANGE UP (ref 0–0)
PCO2 BLDV: 35 MMHG — LOW (ref 42–55)
PH BLDV: 7.37 — SIGNIFICANT CHANGE UP (ref 7.32–7.43)
PLATELET # BLD AUTO: 244 K/UL — SIGNIFICANT CHANGE UP (ref 130–400)
PMV BLD: 10 FL — SIGNIFICANT CHANGE UP (ref 7.4–10.4)
PO2 BLDV: 47 MMHG — HIGH (ref 25–45)
POTASSIUM BLDV-SCNC: 5.3 MMOL/L — HIGH (ref 3.5–5.1)
POTASSIUM SERPL-MCNC: 5.1 MMOL/L — HIGH (ref 3.5–5)
POTASSIUM SERPL-SCNC: 5.1 MMOL/L — HIGH (ref 3.5–5)
RBC # BLD: 3.89 M/UL — LOW (ref 4.7–6.1)
RBC # FLD: 14 % — SIGNIFICANT CHANGE UP (ref 11.5–14.5)
SAO2 % BLDV: 80.5 % — SIGNIFICANT CHANGE UP (ref 67–88)
SODIUM SERPL-SCNC: 134 MMOL/L — LOW (ref 135–146)
WBC # BLD: 7.64 K/UL — SIGNIFICANT CHANGE UP (ref 4.8–10.8)
WBC # FLD AUTO: 7.64 K/UL — SIGNIFICANT CHANGE UP (ref 4.8–10.8)

## 2025-01-16 PROCEDURE — 99223 1ST HOSP IP/OBS HIGH 75: CPT

## 2025-01-16 PROCEDURE — 80053 COMPREHEN METABOLIC PANEL: CPT

## 2025-01-16 PROCEDURE — 97161 PT EVAL LOW COMPLEX 20 MIN: CPT | Mod: GP

## 2025-01-16 PROCEDURE — 85025 COMPLETE CBC W/AUTO DIFF WBC: CPT

## 2025-01-16 PROCEDURE — 70450 CT HEAD/BRAIN W/O DYE: CPT | Mod: MC

## 2025-01-16 PROCEDURE — 71046 X-RAY EXAM CHEST 2 VIEWS: CPT | Mod: 26

## 2025-01-16 PROCEDURE — 97110 THERAPEUTIC EXERCISES: CPT | Mod: GP

## 2025-01-16 PROCEDURE — 93005 ELECTROCARDIOGRAM TRACING: CPT

## 2025-01-16 PROCEDURE — 81001 URINALYSIS AUTO W/SCOPE: CPT

## 2025-01-16 PROCEDURE — 86780 TREPONEMA PALLIDUM: CPT

## 2025-01-16 PROCEDURE — 97116 GAIT TRAINING THERAPY: CPT | Mod: GP

## 2025-01-16 PROCEDURE — 83735 ASSAY OF MAGNESIUM: CPT

## 2025-01-16 PROCEDURE — 36415 COLL VENOUS BLD VENIPUNCTURE: CPT

## 2025-01-16 PROCEDURE — 99285 EMERGENCY DEPT VISIT HI MDM: CPT

## 2025-01-16 PROCEDURE — 84100 ASSAY OF PHOSPHORUS: CPT

## 2025-01-16 PROCEDURE — 0241U: CPT

## 2025-01-16 PROCEDURE — 82607 VITAMIN B-12: CPT

## 2025-01-16 PROCEDURE — 84443 ASSAY THYROID STIM HORMONE: CPT

## 2025-01-16 PROCEDURE — 97530 THERAPEUTIC ACTIVITIES: CPT | Mod: GP

## 2025-01-16 PROCEDURE — 80048 BASIC METABOLIC PNL TOTAL CA: CPT

## 2025-01-16 NOTE — H&P ADULT - HISTORY OF PRESENT ILLNESS
89 y/o M PMHx of HTN, HLD, CKD 3 p/w AMS.       Vitals:  T(F): 97.4   HR: 74   BP: 152/68   RR: 18   SpO2: 98%     SIG labs: HGB 11.7, bl, , K+ 5.3 on VBG, Cr 2.0, b/l 1.8, PCO2 35    Imaging:  CXR -ve        89 y/o M PMHx of HTN, HLD, CKD 3 p/w AMS. History acquired from granddaughter over the phone for mental status. Pt has been confused for the past month with episodes of hallucinations where he thinks people are chasing him and odd behaviors such as taking his clothes off. Family denies       Vitals:  T(F): 97.4   HR: 74   BP: 152/68   RR: 18   SpO2: 98%     SIG labs: HGB 11.7, bl, , K+ 5.3 on VBG, Cr 2.0, b/l 1.8, PCO2 35    Imaging:  CXR -ve        89 y/o M PMHx of HTN, HLD, CKD 3 p/w AMS. History acquired from granddaughter over the phone for mental status. Pt has been confused for the past month with episodes of hallucinations where he thinks people are chasing him and odd behaviors such as taking his clothes off. Family denies recent fevers, cough, sob, episodes of dysuria, hematuria recent travel, bloody/dark stools.       Vitals:  T(F): 97.4   HR: 74   BP: 152/68   RR: 18   SpO2: 98%     SIG labs: HGB 11.7, bl, , K+ 5.3 on VBG, Cr 2.0, b/l 1.8, PCO2 35    Imaging:  CXR -ve

## 2025-01-16 NOTE — ED PROVIDER NOTE - OBJECTIVE STATEMENT
89-year-old male to ED brought in by caretaker for worsening altered mental status.  No fevers no sick contacts or travels trauma.  As per aide he has been taking his clothes off  being confused more than usual less active. Good diet and otherwise well-appearing. HPI and ROS severely limited.  Attempted to call daughter who is his decision-maker but no answer.

## 2025-01-16 NOTE — H&P ADULT - ASSESSMENT
89 y/o M PMHx of HTN, HLD, CKD 3 p/w AMS. Found to be HD stable on RA, slightly hyponatremic and hyperkalemic, and in lily, cr 2.o, b/l 1.8. -ve CXR     #AMS likely 2/2 underlying dementia   - HD stable on RA  - CXR -ve  -  89 y/o M PMHx of HTN, HLD, CKD 3 p/w AMS. Found to be HD stable on RA, slightly hyponatremic and hyperkalemic, and in ailyn, cr 2.o, b/l 1.8. -ve CXR     #TME 2/2 viral infections vs baseline dementia   - HD stable on RA  - CXR -ve  - F/U UA w/ cx  - f/u flu panel   - f/u TSH  - f/u B12    #AILYN on CKD  #Hyperkalemia   #Hyponatremia  - Cr 2.0, b/l around 1.8  - K+ 5.3   -   - Start gentle hydration   - Monitor electrolytes w/ phosphrus   - Trend Cr     #HTN  - c/w benzapril / HCTZ 5/6.25 as lisionoril 5mg, hctz 6.25   - monitor bp    #HLD  - c/w atorvastatin 20mg  - c/w asa      89 y/o M PMHx of HTN, HLD, CKD 3 p/w AMS. Found to be HD stable on RA, slightly hyponatremic and hyperkalemic, and in ailyn, cr 2.o, b/l 1.8. -ve CXR     #TME 2/2 viral infections vs baseline dementia   - HD stable on RA  - CXR -ve  - F/U UA w/ cx  - f/u flu panel   - f/u TSH  - f/u B12    #AILYN on CKD  #Hyperkalemia   #Hyponatremia  - Cr 2.0, b/l around 1.8  - K+ 5.3   -   - Start gentle hydration   - Monitor electrolytes w/ phosphorus   - Trend Cr     #HTN  - c/w benzapril / HCTZ 5/6.25 as lisionoril 5mg, hctz 6.25   - monitor bp  - monitor K+    #HLD  - c/w atorvastatin 20mg  - c/w asa     MISC  DVTPPX: LVNX  GIPPX: NI  CODE: FULL  DIET: DASH LOW K  DISPO: F/U  F/U UA w/ cx  f/u flu panel  f/u TSH  f/u B12, f/u bmp for K+

## 2025-01-16 NOTE — H&P ADULT - NSHPPHYSICALEXAM_GEN_ALL_CORE
GENERAL: NAD, lying in bed comfortably  HEAD:  Atraumatic, normocephalic  EYES: EOMI, PERRLA, conjunctiva and sclera clear  NECK: Supple, trachea midline, no JVD  HEART: Regular rate and rhythm, no murmurs, rubs, or gallops  LUNGS: Unlabored respirations.  Clear to auscultation bilaterally, no crackles, wheezing, or rhonchi  ABDOMEN: Soft, nontender, nondistended, +BS  EXTREMITIES:  No clubbing, cyanosis, or edema  NERVOUS SYSTEM:  A&Ox2   SKIN: No rashes or lesions

## 2025-01-16 NOTE — ED PROVIDER NOTE - PHYSICAL EXAMINATION
EXAM:  CONSTITUTIONAL: WA / WN / NAD  HEAD: NCAT  EYES: PERRL; EOMI; anicteric.  ENT: Normal pharynx; mucous membranes pink/moist, no erythema.  NECK: Supple; no meningeal signs  CARD: RRR; nl S1/S2; no M/R/G. Pulses equal bilaterally.  RESP: Respiratory rate and effort are normal; breath sounds clear and equal bilaterally.  ABD: Soft, NT ND nl bowel sounds; no masses; no rebound  MSK/EXT: No gross deformities; full range of motion.  SKIN: Warm and dry;   NEURO: Awake and alert but confused, Motor 5/5 x 4 extremities, Sensations intact to pain and palpation, Cerebellar testing normal

## 2025-01-16 NOTE — ED ADULT NURSE NOTE - CHIEF COMPLAINT QUOTE
Pt w/ dementia BIB significant other from home due to worsening mental status x 6 months, pt becoming more confused & paranoid that "someone is out to hurt him," +sudden mood swings, angry outbursts and frequent episodes of crying. Pt has poor appetite and has difficulty sleeping. Triage FS=99.

## 2025-01-16 NOTE — H&P ADULT - ATTENDING COMMENTS
A 89-year-old male was brought to the ED by his caretaker for worsening altered mental status. He has exhibited unusual behavior, including taking his clothes off and increased confusion, along with decreased activity. He denies fevers, sick contacts, recent travel, or trauma. He reportedly maintains a good diet and appears generally well.    Vital Signs (Last 24 Hours):    Temperature: 97.3-97.6°F (36.3-36.4°C).  Heart Rate: 63 bpm.  Blood Pressure: 129/68 - 165/76 mmHg.  Respiratory Rate: 18-19 breaths per minute.  SpO2: 100% on room air.  Chest X-Ray: No acute cardiopulmonary disease.    Assessment: Altered mental status (AMS), possibly due to a viral infection of unknown etiology. Metabolic encephalopathy is also suspected. 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    Plan:  Diagnostics: Respiratory viral panel (RVP), TSH, and B12 levels. Monitor creatinine, BUN, electrolytes (including phosphorus).  Management: Fall precautions. One-to-one monitoring as needed. Low potassium diet. Avoid nephrotoxic agents. Continue home medications: benazepril and hydrochlorothiazide (with holding parameters; hold if hyperkalemia worsens) and atorvastatin (Lipitor). No urgent need for renal replacement therapy (RRT) at this time. A 89-year-old male was brought to the ED by his caretaker for worsening altered mental status. He has exhibited unusual behavior, including taking his clothes off and increased confusion, along with decreased activity. He denies fevers, sick contacts, recent travel, or trauma. He reportedly maintains a good diet and appears generally well.    Vital Signs (Last 24 Hours):    Temperature: 97.3-97.6°F (36.3-36.4°C).  Heart Rate: 63 bpm.  Blood Pressure: 129/68 - 165/76 mmHg.  Respiratory Rate: 18-19 breaths per minute.  SpO2: 100% on room air.  Chest X-Ray: No acute cardiopulmonary disease.    Assessment: Altered mental status (AMS), possibly due to a viral infection of unknown etiology. Metabolic encephalopathy is also suspected. 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    Plan:  Diagnostics: Respiratory viral panel (RVP), TSH, and B12 levels. Monitor creatinine, BUN, electrolytes (including phosphorus).  Management: Fall precautions. One-to-one monitoring as needed. Low potassium diet. Avoid nephrotoxic agents. Continue home medications: benazepril and hydrochlorothiazide (with holding parameters; hold if hyperkalemia worsens) and atorvastatin (Lipitor). No urgent need for renal replacement therapy (RRT) at this time.      Seen on 01/16

## 2025-01-16 NOTE — ED ADULT TRIAGE NOTE - CHIEF COMPLAINT QUOTE
Pt w/ dementia BIB significant other from home due to worsening of mental status x 6 months, pt becoming more confused & paranoid that "someone is out to hurt him," +sudden mood swings and frequent episodes of crying. Pt has poor appetite and has difficulty sleeping. Triage FS=99. Pt w/ dementia BIB significant other from home due to worsening mental status x 6 months, pt becoming more confused & paranoid that "someone is out to hurt him," +sudden mood swings, angry outbursts and frequent episodes of crying. Pt has poor appetite and has difficulty sleeping. Triage FS=99.

## 2025-01-16 NOTE — ED PROVIDER NOTE - NS ED ATTENDING STATEMENT MOD
Spoke with Zak at [facility] pharmacy. Called in refills to pharmacy. Zak inputed into system and will get refill ready for pt.    Pt informed via [employer].   Attending Only

## 2025-01-16 NOTE — ED ADULT NURSE NOTE - NSFALLHARMRISKINTERV_ED_ALL_ED

## 2025-01-16 NOTE — ED ADULT TRIAGE NOTE - CCCP TRG CHIEF CMPLNT
Pt here with c/o bilateral upper extremity numbness,  pt reports left upper arm numbness/weakness onset yesterday 0700, right arm numbness onset 1300 yesterday.  Reports left arm numbness is more severe than right arm, denies any pain, denies speech abnormalities.   
altered mental status

## 2025-01-17 LAB
ALBUMIN SERPL ELPH-MCNC: 3.9 G/DL — SIGNIFICANT CHANGE UP (ref 3.5–5.2)
ALP SERPL-CCNC: 68 U/L — SIGNIFICANT CHANGE UP (ref 30–115)
ALT FLD-CCNC: 12 U/L — SIGNIFICANT CHANGE UP (ref 0–41)
ANION GAP SERPL CALC-SCNC: 11 MMOL/L — SIGNIFICANT CHANGE UP (ref 7–14)
AST SERPL-CCNC: 14 U/L — SIGNIFICANT CHANGE UP (ref 0–41)
BASOPHILS # BLD AUTO: 0.05 K/UL — SIGNIFICANT CHANGE UP (ref 0–0.2)
BASOPHILS NFR BLD AUTO: 0.9 % — SIGNIFICANT CHANGE UP (ref 0–1)
BILIRUB SERPL-MCNC: 0.9 MG/DL — SIGNIFICANT CHANGE UP (ref 0.2–1.2)
BUN SERPL-MCNC: 33 MG/DL — HIGH (ref 10–20)
CALCIUM SERPL-MCNC: 9.6 MG/DL — SIGNIFICANT CHANGE UP (ref 8.4–10.5)
CHLORIDE SERPL-SCNC: 106 MMOL/L — SIGNIFICANT CHANGE UP (ref 98–110)
CO2 SERPL-SCNC: 20 MMOL/L — SIGNIFICANT CHANGE UP (ref 17–32)
CREAT SERPL-MCNC: 1.7 MG/DL — HIGH (ref 0.7–1.5)
EGFR: 37 ML/MIN/1.73M2 — LOW
EOSINOPHIL # BLD AUTO: 0.31 K/UL — SIGNIFICANT CHANGE UP (ref 0–0.7)
EOSINOPHIL NFR BLD AUTO: 5.4 % — SIGNIFICANT CHANGE UP (ref 0–8)
FLUAV AG NPH QL: SIGNIFICANT CHANGE UP
FLUBV AG NPH QL: SIGNIFICANT CHANGE UP
GLUCOSE SERPL-MCNC: 89 MG/DL — SIGNIFICANT CHANGE UP (ref 70–99)
HCT VFR BLD CALC: 32.4 % — LOW (ref 42–52)
HGB BLD-MCNC: 10.4 G/DL — LOW (ref 14–18)
IMM GRANULOCYTES NFR BLD AUTO: 0.4 % — HIGH (ref 0.1–0.3)
LYMPHOCYTES # BLD AUTO: 0.95 K/UL — LOW (ref 1.2–3.4)
LYMPHOCYTES # BLD AUTO: 16.7 % — LOW (ref 20.5–51.1)
MAGNESIUM SERPL-MCNC: 2.1 MG/DL — SIGNIFICANT CHANGE UP (ref 1.8–2.4)
MCHC RBC-ENTMCNC: 30.2 PG — SIGNIFICANT CHANGE UP (ref 27–31)
MCHC RBC-ENTMCNC: 32.1 G/DL — SIGNIFICANT CHANGE UP (ref 32–37)
MCV RBC AUTO: 94.2 FL — HIGH (ref 80–94)
MONOCYTES # BLD AUTO: 0.54 K/UL — SIGNIFICANT CHANGE UP (ref 0.1–0.6)
MONOCYTES NFR BLD AUTO: 9.5 % — HIGH (ref 1.7–9.3)
NEUTROPHILS # BLD AUTO: 3.82 K/UL — SIGNIFICANT CHANGE UP (ref 1.4–6.5)
NEUTROPHILS NFR BLD AUTO: 67.1 % — SIGNIFICANT CHANGE UP (ref 42.2–75.2)
NRBC # BLD: 0 /100 WBCS — SIGNIFICANT CHANGE UP (ref 0–0)
NRBC BLD-RTO: 0 /100 WBCS — SIGNIFICANT CHANGE UP (ref 0–0)
PHOSPHATE SERPL-MCNC: 3.3 MG/DL — SIGNIFICANT CHANGE UP (ref 2.1–4.9)
PLATELET # BLD AUTO: 219 K/UL — SIGNIFICANT CHANGE UP (ref 130–400)
PMV BLD: 10.2 FL — SIGNIFICANT CHANGE UP (ref 7.4–10.4)
POTASSIUM SERPL-MCNC: 4.8 MMOL/L — SIGNIFICANT CHANGE UP (ref 3.5–5)
POTASSIUM SERPL-SCNC: 4.8 MMOL/L — SIGNIFICANT CHANGE UP (ref 3.5–5)
PROT SERPL-MCNC: 6.1 G/DL — SIGNIFICANT CHANGE UP (ref 6–8)
RBC # BLD: 3.44 M/UL — LOW (ref 4.7–6.1)
RBC # FLD: 14.2 % — SIGNIFICANT CHANGE UP (ref 11.5–14.5)
RSV RNA NPH QL NAA+NON-PROBE: SIGNIFICANT CHANGE UP
SARS-COV-2 RNA SPEC QL NAA+PROBE: SIGNIFICANT CHANGE UP
SODIUM SERPL-SCNC: 137 MMOL/L — SIGNIFICANT CHANGE UP (ref 135–146)
VIT B12 SERPL-MCNC: 164 PG/ML — LOW (ref 232–1245)
WBC # BLD: 5.69 K/UL — SIGNIFICANT CHANGE UP (ref 4.8–10.8)
WBC # FLD AUTO: 5.69 K/UL — SIGNIFICANT CHANGE UP (ref 4.8–10.8)

## 2025-01-17 PROCEDURE — 70450 CT HEAD/BRAIN W/O DYE: CPT | Mod: 26

## 2025-01-17 PROCEDURE — 99233 SBSQ HOSP IP/OBS HIGH 50: CPT

## 2025-01-17 RX ORDER — BACTERIOSTATIC SODIUM CHLORIDE 0.9 %
1000 VIAL (ML) INJECTION
Refills: 0 | Status: DISCONTINUED | OUTPATIENT
Start: 2025-01-17 | End: 2025-01-22

## 2025-01-17 RX ORDER — ATORVASTATIN CALCIUM 80 MG/1
20 TABLET, FILM COATED ORAL AT BEDTIME
Refills: 0 | Status: DISCONTINUED | OUTPATIENT
Start: 2025-01-17 | End: 2025-01-23

## 2025-01-17 RX ORDER — BENAZEPRIL HYDROCHLORIDE AND HYDROCHLOROTHIAZIDE 20; 25 MG/1; MG/1
1 TABLET, FILM COATED ORAL
Refills: 0 | DISCHARGE

## 2025-01-17 RX ORDER — ACETAMINOPHEN, DIPHENHYDRAMINE HCL, PHENYLEPHRINE HCL 325; 25; 5 MG/1; MG/1; MG/1
3 TABLET ORAL AT BEDTIME
Refills: 0 | Status: DISCONTINUED | OUTPATIENT
Start: 2025-01-17 | End: 2025-01-23

## 2025-01-17 RX ORDER — ENOXAPARIN SODIUM 100 MG/ML
40 INJECTION SUBCUTANEOUS EVERY 24 HOURS
Refills: 0 | Status: DISCONTINUED | OUTPATIENT
Start: 2025-01-17 | End: 2025-01-23

## 2025-01-17 RX ORDER — ASPIRIN 81 MG/1
81 TABLET, COATED ORAL DAILY
Refills: 0 | Status: DISCONTINUED | OUTPATIENT
Start: 2025-01-17 | End: 2025-01-23

## 2025-01-17 RX ADMIN — ASPIRIN 81 MILLIGRAM(S): 81 TABLET, COATED ORAL at 12:31

## 2025-01-17 RX ADMIN — Medication 5 MILLIGRAM(S): at 06:04

## 2025-01-17 RX ADMIN — Medication 75 MILLILITER(S): at 06:09

## 2025-01-17 RX ADMIN — ATORVASTATIN CALCIUM 20 MILLIGRAM(S): 80 TABLET, FILM COATED ORAL at 22:05

## 2025-01-17 RX ADMIN — ENOXAPARIN SODIUM 40 MILLIGRAM(S): 100 INJECTION SUBCUTANEOUS at 06:05

## 2025-01-17 NOTE — PROGRESS NOTE ADULT - ASSESSMENT
89 y/o M PMHx of HTN, HLD, CKD 3 p/w AMS. Found to be HD stable on RA, slightly hyponatremic and hyperkalemic, and in ailyn, cr 2.o, b/l 1.8. -ve CXR     #TME 2/2 viral infections vs baseline dementia   - HD stable on RA  - CXR -ve  - F/U UA w/ cx  - f/u flu panel   - f/u TSH and B12    #AILYN on CKD  #Hyperkalemia   #Hyponatremia  - Cr 2.0, b/l around 1.8  - K+ 5.3   -   - Start gentle hydration   - Monitor electrolytes w/ phosphorus   - Trend Cr     #HTN  - c/w benzapril / HCTZ 5/6.25 as lisionoril 5mg, hctz 6.25   - monitor bp  - monitor K+    #HLD  - c/w atorvastatin 20mg  - c/w asa     MISC  DVTPPX: LVNX  GIPPX: NI  CODE: FULL  DIET: DASH LOW K  DISPO: F/U  F/U UA w/ cx  f/u flu panel  f/u TSH  f/u B12, f/u bmp for K+

## 2025-01-17 NOTE — PATIENT PROFILE ADULT - FUNCTIONAL ASSESSMENT - BASIC MOBILITY 6.
3-calculated by average/Not able to assess (calculate score using [facility] averaging method)

## 2025-01-17 NOTE — PATIENT PROFILE ADULT - FALL HARM RISK - HARM RISK INTERVENTIONS
CHIEF COMPLAINT:   Congestion and Cough    HPI:  Mira Lees is a 82 year old female who comes in today with son complaining of cough, congestion for the last one week.  She had CXR Monday- negative.  Sweatiness more often. Some lower extremity swelling, shortness of breath with activity. She does have Advair that she takes, albuterol and Flonase-not using routinely. She has felt feverish and sweaty. The family is going out of town for the weekends wanted her to be evaluated. Decreased appetite has been noted. Son reports she does look better today than what she did over the weekend.    ROS:  Comprehensive review of systems was reviewed and discussed with the patient, and was otherwise negative, except as noted in the history of present illness, above.    PAST MEDICAL, SURGICAL, MEDICATION, ALLERGY, & SOCIAL HISTORIES:  I have reviewed the past medical history, family history, social history, medications and allergies listed in the medical record as obtained by my nursing staff and support staff and agree with their documentation.    Past Medical History:   Diagnosis Date   • Allergy    • Anxiety    • CAD (coronary artery disease)    • Cataract    • COPD (chronic obstructive pulmonary disease) (CMS/[facility])    • Depression    • Disorder of bone and cartilage, unspecified 06/06/2011   • Hip pain, acute 4/27/2017   • Hyperlipidemia    • Hypothyroidism    • Insomnia    • MRSA (methicillin resistant staph aureus) culture positive     recurrent     Past Surgical History:   Procedure Laterality Date   • Colonoscopy diagnostic  10/18/2011   • D and c      D&C   • Discission,2nd cataract,laser  08/13/14    Yag Laser/Second Cataract both eyes   • Excision of lingual tonsil     • Insert intracoronary stent  06/25/2008    PTCA, Single Stent   • Left heart cath,percutaneous  06/25/2008    Cardiac Cath   • Occult blood test tube  06/20/2012   • Pap smear,routine  05/25/2011   • Removal of tonsils,<11 y/o     • Remv cataract  extracap insert lens  10/03/2012    Cataract Removal Lens Implant   • Remv cataract extracap insert lens  10/10/2012    Cataract Removal Lens Implant   • Xray mammogram screening bilat  2012     Social History     Tobacco Use   • Smoking status: Former Smoker     Types: Cigarettes     Last attempt to quit: 1995     Years since quittin.4   • Smokeless tobacco: Never Used   Substance Use Topics   • Alcohol use: Yes     Alcohol/week: 0.6 oz     Types: 1 Standard drinks or equivalent per week     Comment: occasional     OBJECTIVE:  Visit Vitals  /74   Pulse 89   Temp 98 °F (36.7 °C) (Tympanic)   Resp 18   SpO2 94%     Physical Exam:  General:  Pleasant, well-developed, well-nourished, adult female in no acute distress, breathing comfortably and well-hydrated.  HEENT:  Eyes: conjunctivae and sclerae normal and pupils equal, round, reactive to light and accommodation    Nose:  Nasal mucosa red with clear discharge    Mouth:  Lips, oral mucosa, and tongue normal. Teeth and gums normal. Oropharynx clear with posterior cobblestoning noted.    Ears:  External ears normal. Canals clear. Tympanic membranes are clear with all landmarks noted, fluid noted posteriorly bilaterally.     Sinuses:  nontender to maxillary/frontal sinus area with palpation.  NECK:  Supple and no cervical, submandibular or supraclavicular adenopathy or thyromegaly  CV:  Regular rate, regular rhythm, No murmur, rub, gallop  RESP:  Bilateral rhonchi to auscultation. Congested cough on exam.  SKIN: No rash noted.  EXTREMITIES:  Normal, No cyanosis, clubbing and trace lower extremity edema    ASSESSMENT:  ED Diagnosis   1. Recurrent acute serous otitis media of both ears     2. COPD (chronic obstructive pulmonary disease) with acute bronchitis (CMS/[facility])       PLAN:  Orders Placed This Encounter   • fluticasone (FLONASE) 50 MCG/ACT nasal spray   • cetirizine (ZYRTEC) 10 MG tablet   • doxycycline monohydrate (MONODOX) 100 MG capsule      Doxycycline 100 mg p.o. b.i.d. ×10 days, cetirizine 10 daily, refill of her Flonase was given.  Follow-up recommendation is made to see Primary Care Provider for recheck within 1 weeks and to coordinate further care as necessary.  Patient is welcome to return sooner for worsening symptoms, intolerance of treatment, or any other concerns.      Devan Jacob MD     Assistance OOB with selected safe patient handling equipment/Communicate Risk of Fall with Harm to all staff/Discuss with provider need for PT consult/Monitor gait and stability/Provide patient with walking aids - walker, cane, crutches/Reinforce activity limits and safety measures with patient and family/Tailored Fall Risk Interventions/Visual Cue: Yellow wristband and red socks/Bed in lowest position, wheels locked, appropriate side rails in place/Call bell, personal items and telephone in reach/Instruct patient to call for assistance before getting out of bed or chair/Non-slip footwear when patient is out of bed/[place] to call system/Physically safe environment - no spills, clutter or unnecessary equipment/Purposeful Proactive Rounding/Room/bathroom lighting operational, light cord in reach

## 2025-01-17 NOTE — PROGRESS NOTE ADULT - ATTENDING COMMENTS
#Altered mental status  oriented x3 but appears confused at times  check cth  rvp  cxr clear  b12, folate, tsh  confirm baseline with family    #Progress Note Handoff  Pending (specify): rvp, cth, b12, folate  Family discussion:   Disposition: home vs. snf #Altered mental status  oriented x3 but appears confused at times  check cth  rvp  cxr clear  b12, folate, tsh  suspected underlying dementia per family; unable to care for self  plan for snf placement  outpt neuro eval    #Progress Note Handoff  Pending (specify): rvp, cth, b12, folate  Family discussion:   Disposition: home vs. snf

## 2025-01-18 LAB
ALBUMIN SERPL ELPH-MCNC: 3.7 G/DL — SIGNIFICANT CHANGE UP (ref 3.5–5.2)
ALP SERPL-CCNC: 60 U/L — SIGNIFICANT CHANGE UP (ref 30–115)
ALT FLD-CCNC: 11 U/L — SIGNIFICANT CHANGE UP (ref 0–41)
ANION GAP SERPL CALC-SCNC: 8 MMOL/L — SIGNIFICANT CHANGE UP (ref 7–14)
APPEARANCE UR: CLEAR — SIGNIFICANT CHANGE UP
AST SERPL-CCNC: 12 U/L — SIGNIFICANT CHANGE UP (ref 0–41)
BASOPHILS # BLD AUTO: 0.06 K/UL — SIGNIFICANT CHANGE UP (ref 0–0.2)
BASOPHILS NFR BLD AUTO: 0.9 % — SIGNIFICANT CHANGE UP (ref 0–1)
BILIRUB SERPL-MCNC: 1.1 MG/DL — SIGNIFICANT CHANGE UP (ref 0.2–1.2)
BILIRUB UR-MCNC: NEGATIVE — SIGNIFICANT CHANGE UP
BUN SERPL-MCNC: 28 MG/DL — HIGH (ref 10–20)
CALCIUM SERPL-MCNC: 9.1 MG/DL — SIGNIFICANT CHANGE UP (ref 8.4–10.5)
CHLORIDE SERPL-SCNC: 107 MMOL/L — SIGNIFICANT CHANGE UP (ref 98–110)
CO2 SERPL-SCNC: 24 MMOL/L — SIGNIFICANT CHANGE UP (ref 17–32)
COLOR SPEC: YELLOW — SIGNIFICANT CHANGE UP
CREAT SERPL-MCNC: 1.6 MG/DL — HIGH (ref 0.7–1.5)
DIFF PNL FLD: NEGATIVE — SIGNIFICANT CHANGE UP
EGFR: 40 ML/MIN/1.73M2 — LOW
EOSINOPHIL # BLD AUTO: 0.28 K/UL — SIGNIFICANT CHANGE UP (ref 0–0.7)
EOSINOPHIL NFR BLD AUTO: 4.1 % — SIGNIFICANT CHANGE UP (ref 0–8)
GLUCOSE SERPL-MCNC: 86 MG/DL — SIGNIFICANT CHANGE UP (ref 70–99)
GLUCOSE UR QL: NEGATIVE MG/DL — SIGNIFICANT CHANGE UP
HCT VFR BLD CALC: 33.4 % — LOW (ref 42–52)
HGB BLD-MCNC: 10.7 G/DL — LOW (ref 14–18)
IMM GRANULOCYTES NFR BLD AUTO: 0.1 % — SIGNIFICANT CHANGE UP (ref 0.1–0.3)
KETONES UR-MCNC: NEGATIVE MG/DL — SIGNIFICANT CHANGE UP
LEUKOCYTE ESTERASE UR-ACNC: ABNORMAL
LYMPHOCYTES # BLD AUTO: 1.02 K/UL — LOW (ref 1.2–3.4)
LYMPHOCYTES # BLD AUTO: 15 % — LOW (ref 20.5–51.1)
MAGNESIUM SERPL-MCNC: 2.1 MG/DL — SIGNIFICANT CHANGE UP (ref 1.8–2.4)
MCHC RBC-ENTMCNC: 30 PG — SIGNIFICANT CHANGE UP (ref 27–31)
MCHC RBC-ENTMCNC: 32 G/DL — SIGNIFICANT CHANGE UP (ref 32–37)
MCV RBC AUTO: 93.6 FL — SIGNIFICANT CHANGE UP (ref 80–94)
MONOCYTES # BLD AUTO: 0.58 K/UL — SIGNIFICANT CHANGE UP (ref 0.1–0.6)
MONOCYTES NFR BLD AUTO: 8.5 % — SIGNIFICANT CHANGE UP (ref 1.7–9.3)
NEUTROPHILS # BLD AUTO: 4.87 K/UL — SIGNIFICANT CHANGE UP (ref 1.4–6.5)
NEUTROPHILS NFR BLD AUTO: 71.4 % — SIGNIFICANT CHANGE UP (ref 42.2–75.2)
NITRITE UR-MCNC: NEGATIVE — SIGNIFICANT CHANGE UP
NRBC # BLD: 0 /100 WBCS — SIGNIFICANT CHANGE UP (ref 0–0)
NRBC BLD-RTO: 0 /100 WBCS — SIGNIFICANT CHANGE UP (ref 0–0)
PH UR: 5.5 — SIGNIFICANT CHANGE UP (ref 5–8)
PLATELET # BLD AUTO: 178 K/UL — SIGNIFICANT CHANGE UP (ref 130–400)
PMV BLD: 11.5 FL — HIGH (ref 7.4–10.4)
POTASSIUM SERPL-MCNC: 4.6 MMOL/L — SIGNIFICANT CHANGE UP (ref 3.5–5)
POTASSIUM SERPL-SCNC: 4.6 MMOL/L — SIGNIFICANT CHANGE UP (ref 3.5–5)
PROT SERPL-MCNC: 5.6 G/DL — LOW (ref 6–8)
PROT UR-MCNC: NEGATIVE MG/DL — SIGNIFICANT CHANGE UP
RBC # BLD: 3.57 M/UL — LOW (ref 4.7–6.1)
RBC # FLD: 13.7 % — SIGNIFICANT CHANGE UP (ref 11.5–14.5)
SODIUM SERPL-SCNC: 139 MMOL/L — SIGNIFICANT CHANGE UP (ref 135–146)
SP GR SPEC: 1.01 — SIGNIFICANT CHANGE UP (ref 1–1.03)
TSH SERPL-MCNC: 1.4 UIU/ML — SIGNIFICANT CHANGE UP (ref 0.27–4.2)
UROBILINOGEN FLD QL: 0.2 MG/DL — SIGNIFICANT CHANGE UP (ref 0.2–1)
WBC # BLD: 6.82 K/UL — SIGNIFICANT CHANGE UP (ref 4.8–10.8)
WBC # FLD AUTO: 6.82 K/UL — SIGNIFICANT CHANGE UP (ref 4.8–10.8)

## 2025-01-18 PROCEDURE — 99232 SBSQ HOSP IP/OBS MODERATE 35: CPT

## 2025-01-18 RX ORDER — CYANOCOBALAMIN (VITAMIN B-12) 1000MCG/ML
1000 VIAL (ML) INJECTION DAILY
Refills: 0 | Status: DISCONTINUED | OUTPATIENT
Start: 2025-01-18 | End: 2025-01-23

## 2025-01-18 RX ADMIN — ENOXAPARIN SODIUM 40 MILLIGRAM(S): 100 INJECTION SUBCUTANEOUS at 05:21

## 2025-01-18 RX ADMIN — ATORVASTATIN CALCIUM 20 MILLIGRAM(S): 80 TABLET, FILM COATED ORAL at 21:23

## 2025-01-18 RX ADMIN — Medication 1000 MICROGRAM(S): at 11:12

## 2025-01-18 RX ADMIN — Medication 5 MILLIGRAM(S): at 05:22

## 2025-01-18 RX ADMIN — ASPIRIN 81 MILLIGRAM(S): 81 TABLET, COATED ORAL at 11:12

## 2025-01-18 NOTE — PROGRESS NOTE ADULT - ASSESSMENT
89 y/o M PMHx of HTN, HLD, CKD 3 p/w AMS. Found to be HD stable on RA, slightly hyponatremic and hyperkalemic, and in lily, cr 2.o, b/l 1.8. -ve CXR     # Altered Mental status likely metabolic  dementia   - HD stable on RA  - CXR -ve  - f/u UA  - f/u flu panel   - f/u TSH and B12    #Hyperkalemia   #Hyponatremia  # CKD  - Cr 2.0, b/l around 1.8 >> 1.6 today   - K+ 5.3  >> 4.6 today   -    - Monitor electrolytes w/ phosphorus   - Trend Cr     #HTN  - c/w benzapril / HCTZ 5/6.25 as lisionoril 5mg, hctz 6.25   - monitor bp  - monitor K+    #HLD  - c/w atorvastatin 20mg  - c/w asa     MISC  DVTPPX: LVNX  GIPPX: NI  CODE: FULL  DIET: DASH LOW K    Pending: Mental status improvement/ PT  Dispo: TBD /SNF

## 2025-01-19 LAB — T PALLIDUM AB TITR SER: NEGATIVE — SIGNIFICANT CHANGE UP

## 2025-01-19 PROCEDURE — 99232 SBSQ HOSP IP/OBS MODERATE 35: CPT

## 2025-01-19 RX ORDER — CEFTRIAXONE 250 MG/1
1000 INJECTION, POWDER, FOR SOLUTION INTRAMUSCULAR; INTRAVENOUS EVERY 24 HOURS
Refills: 0 | Status: COMPLETED | OUTPATIENT
Start: 2025-01-20 | End: 2025-01-22

## 2025-01-19 RX ORDER — CEFTRIAXONE 250 MG/1
1000 INJECTION, POWDER, FOR SOLUTION INTRAMUSCULAR; INTRAVENOUS ONCE
Refills: 0 | Status: COMPLETED | OUTPATIENT
Start: 2025-01-19 | End: 2025-01-19

## 2025-01-19 RX ORDER — CEFTRIAXONE 250 MG/1
INJECTION, POWDER, FOR SOLUTION INTRAMUSCULAR; INTRAVENOUS
Refills: 0 | Status: COMPLETED | OUTPATIENT
Start: 2025-01-19 | End: 2025-01-23

## 2025-01-19 RX ADMIN — Medication 5 MILLIGRAM(S): at 05:42

## 2025-01-19 RX ADMIN — ASPIRIN 81 MILLIGRAM(S): 81 TABLET, COATED ORAL at 11:59

## 2025-01-19 RX ADMIN — ACETAMINOPHEN, DIPHENHYDRAMINE HCL, PHENYLEPHRINE HCL 3 MILLIGRAM(S): 325; 25; 5 TABLET ORAL at 21:45

## 2025-01-19 RX ADMIN — CEFTRIAXONE 1000 MILLIGRAM(S): 250 INJECTION, POWDER, FOR SOLUTION INTRAMUSCULAR; INTRAVENOUS at 13:13

## 2025-01-19 RX ADMIN — ENOXAPARIN SODIUM 40 MILLIGRAM(S): 100 INJECTION SUBCUTANEOUS at 05:41

## 2025-01-19 RX ADMIN — ATORVASTATIN CALCIUM 20 MILLIGRAM(S): 80 TABLET, FILM COATED ORAL at 21:44

## 2025-01-19 RX ADMIN — Medication 1000 MICROGRAM(S): at 11:59

## 2025-01-19 NOTE — PROGRESS NOTE ADULT - ASSESSMENT
89 y/o M PMHx of HTN, HLD, CKD 3 p/w AMS. Found to be HD stable on RA, slightly hyponatremic and hyperkalemic, and in ailyn, cr 2.o, b/l 1.8. -ve CXR     #TME 2/2 viral infections vs baseline dementia   - HD stable on RA  - CXR -ve  - F/U UA w/ cx- neg  - f/u flu panel - neg  - f/u TSH wnl and B12-low    #AILYN on CKD  #Hyperkalemia   #Hyponatremia  - Cr 2.0, b/l around 1.8  - K+ 5.3   -   - Start gentle hydration   - Monitor electrolytes w/ phosphorus   - Trend Cr     #HTN  - c/w benzapril / HCTZ 5/6.25 as lisionoril 5mg, hctz 6.25   - monitor bp  - monitor K+    #HLD  - c/w atorvastatin 20mg  - c/w asa     MISC  DVTPPX: LVNX  GIPPX: NI  CODE: FULL

## 2025-01-19 NOTE — PHYSICAL THERAPY INITIAL EVALUATION ADULT - IMPAIRMENTS FOUND, PT EVAL
aerobic capacity/endurance/cognitive impairment/gait, locomotion, and balance/muscle strength/posture

## 2025-01-19 NOTE — PHYSICAL THERAPY INITIAL EVALUATION ADULT - PERTINENT HX OF CURRENT PROBLEM, REHAB EVAL
89 y/o M PMHx of HTN, HLD, CKD 3 p/w AMS. History acquired from granddaughter over the phone for mental status. Pt has been confused for the past month with episodes of hallucinations where he thinks people are chasing him and odd behaviors such as taking his clothes off. Family denies recent fevers, cough, sob, episodes of dysuria, hematuria recent travel, bloody/dark stools.

## 2025-01-19 NOTE — PHYSICAL THERAPY INITIAL EVALUATION ADULT - ADDITIONAL COMMENTS
Pt is very confused, poor historian. Pt lives at home with a female friend, uses a walker to ambulate, has 3 steps to enter his home. Female friend helps him with his medications when she is home.

## 2025-01-19 NOTE — PROGRESS NOTE ADULT - ASSESSMENT
89 y/o M PMHx of HTN, HLD, CKD 3 p/w AMS. Found to be HD stable on RA, slightly hyponatremic and hyperkalemic, and in lily, cr 2.o, b/l 1.8. -ve CXR     # Altered Mental status likely metabolic encephalopathy with progressive    dementia   - HD stable on RA  - CXR -ve  -  UA >> small LE, with bacteria > would treat as uncomplicated cystitis.  - RVP >>  Neg   - TSH > 1.40  and B12 >> 164  >> would supplement for deficiency     #Hyperkalemia   #Hyponatremia  # CKD  - Cr 2.0, b/l around 1.8 >> 1.6   - K+ 5.3  >> 4.6   -     - Trend Cr     #HTN  - c/w benzapril / HCTZ 5/6.25 as lisionoril 5mg, hctz 6.25   - monitor bp  - monitor K+    #HLD  - c/w atorvastatin 20mg  - c/w asa     MISC  DVTPPX: LVNX  GIPPX: NI  CODE: FULL  DIET: DASH LOW K  Fall precaution.     Pending: Mental status improvement/ PT  Dispo: TBD /SNF

## 2025-01-20 LAB
ALBUMIN SERPL ELPH-MCNC: 4.1 G/DL — SIGNIFICANT CHANGE UP (ref 3.5–5.2)
ALP SERPL-CCNC: 72 U/L — SIGNIFICANT CHANGE UP (ref 30–115)
ALT FLD-CCNC: 12 U/L — SIGNIFICANT CHANGE UP (ref 0–41)
ANION GAP SERPL CALC-SCNC: 15 MMOL/L — HIGH (ref 7–14)
AST SERPL-CCNC: 18 U/L — SIGNIFICANT CHANGE UP (ref 0–41)
BASOPHILS # BLD AUTO: 0.06 K/UL — SIGNIFICANT CHANGE UP (ref 0–0.2)
BASOPHILS NFR BLD AUTO: 0.6 % — SIGNIFICANT CHANGE UP (ref 0–1)
BILIRUB SERPL-MCNC: 1.3 MG/DL — HIGH (ref 0.2–1.2)
BUN SERPL-MCNC: 29 MG/DL — HIGH (ref 10–20)
CALCIUM SERPL-MCNC: 9.7 MG/DL — SIGNIFICANT CHANGE UP (ref 8.4–10.5)
CHLORIDE SERPL-SCNC: 103 MMOL/L — SIGNIFICANT CHANGE UP (ref 98–110)
CO2 SERPL-SCNC: 21 MMOL/L — SIGNIFICANT CHANGE UP (ref 17–32)
CREAT SERPL-MCNC: 1.6 MG/DL — HIGH (ref 0.7–1.5)
EGFR: 40 ML/MIN/1.73M2 — LOW
EOSINOPHIL # BLD AUTO: 0.27 K/UL — SIGNIFICANT CHANGE UP (ref 0–0.7)
EOSINOPHIL NFR BLD AUTO: 2.9 % — SIGNIFICANT CHANGE UP (ref 0–8)
GLUCOSE SERPL-MCNC: 80 MG/DL — SIGNIFICANT CHANGE UP (ref 70–99)
HCT VFR BLD CALC: 38.7 % — LOW (ref 42–52)
HGB BLD-MCNC: 12.6 G/DL — LOW (ref 14–18)
IMM GRANULOCYTES NFR BLD AUTO: 0.3 % — SIGNIFICANT CHANGE UP (ref 0.1–0.3)
LYMPHOCYTES # BLD AUTO: 1 K/UL — LOW (ref 1.2–3.4)
LYMPHOCYTES # BLD AUTO: 10.8 % — LOW (ref 20.5–51.1)
MAGNESIUM SERPL-MCNC: 2 MG/DL — SIGNIFICANT CHANGE UP (ref 1.8–2.4)
MCHC RBC-ENTMCNC: 29.9 PG — SIGNIFICANT CHANGE UP (ref 27–31)
MCHC RBC-ENTMCNC: 32.6 G/DL — SIGNIFICANT CHANGE UP (ref 32–37)
MCV RBC AUTO: 91.9 FL — SIGNIFICANT CHANGE UP (ref 80–94)
MONOCYTES # BLD AUTO: 0.7 K/UL — HIGH (ref 0.1–0.6)
MONOCYTES NFR BLD AUTO: 7.5 % — SIGNIFICANT CHANGE UP (ref 1.7–9.3)
NEUTROPHILS # BLD AUTO: 7.23 K/UL — HIGH (ref 1.4–6.5)
NEUTROPHILS NFR BLD AUTO: 77.9 % — HIGH (ref 42.2–75.2)
NRBC # BLD: 0 /100 WBCS — SIGNIFICANT CHANGE UP (ref 0–0)
NRBC BLD-RTO: 0 /100 WBCS — SIGNIFICANT CHANGE UP (ref 0–0)
PLATELET # BLD AUTO: 275 K/UL — SIGNIFICANT CHANGE UP (ref 130–400)
PMV BLD: 10.1 FL — SIGNIFICANT CHANGE UP (ref 7.4–10.4)
POTASSIUM SERPL-MCNC: 4.7 MMOL/L — SIGNIFICANT CHANGE UP (ref 3.5–5)
POTASSIUM SERPL-SCNC: 4.7 MMOL/L — SIGNIFICANT CHANGE UP (ref 3.5–5)
PROT SERPL-MCNC: 6.2 G/DL — SIGNIFICANT CHANGE UP (ref 6–8)
RBC # BLD: 4.21 M/UL — LOW (ref 4.7–6.1)
RBC # FLD: 13.3 % — SIGNIFICANT CHANGE UP (ref 11.5–14.5)
SODIUM SERPL-SCNC: 139 MMOL/L — SIGNIFICANT CHANGE UP (ref 135–146)
WBC # BLD: 9.29 K/UL — SIGNIFICANT CHANGE UP (ref 4.8–10.8)
WBC # FLD AUTO: 9.29 K/UL — SIGNIFICANT CHANGE UP (ref 4.8–10.8)

## 2025-01-20 PROCEDURE — 93010 ELECTROCARDIOGRAM REPORT: CPT

## 2025-01-20 PROCEDURE — 99232 SBSQ HOSP IP/OBS MODERATE 35: CPT

## 2025-01-20 RX ORDER — ANTISEPTIC SURGICAL SCRUB 0.04 MG/ML
1 SOLUTION TOPICAL
Refills: 0 | Status: DISCONTINUED | OUTPATIENT
Start: 2025-01-20 | End: 2025-01-23

## 2025-01-20 RX ADMIN — Medication 5 MILLIGRAM(S): at 05:37

## 2025-01-20 RX ADMIN — Medication 1000 MICROGRAM(S): at 12:39

## 2025-01-20 RX ADMIN — ASPIRIN 81 MILLIGRAM(S): 81 TABLET, COATED ORAL at 12:39

## 2025-01-20 RX ADMIN — ANTISEPTIC SURGICAL SCRUB 1 APPLICATION(S): 0.04 SOLUTION TOPICAL at 12:42

## 2025-01-20 RX ADMIN — CEFTRIAXONE 100 MILLIGRAM(S): 250 INJECTION, POWDER, FOR SOLUTION INTRAMUSCULAR; INTRAVENOUS at 12:38

## 2025-01-20 RX ADMIN — ENOXAPARIN SODIUM 40 MILLIGRAM(S): 100 INJECTION SUBCUTANEOUS at 05:37

## 2025-01-20 RX ADMIN — ATORVASTATIN CALCIUM 20 MILLIGRAM(S): 80 TABLET, FILM COATED ORAL at 21:53

## 2025-01-20 NOTE — PROGRESS NOTE ADULT - ASSESSMENT
89 y/o M PMHx of HTN, HLD, CKD 3 p/w AMS. Found to be HD stable on RA, slightly hyponatremic and hyperkalemic, and in lily, cr 2.o, b/l 1.8. -ve CXR     # Altered Mental status likely metabolic encephalopathy with progressive  dementia   - HD stable on RA  - CXR -ve  -  UA >> small LE, with bacteria > would treat as uncomplicated cystitis.  - RVP >>  Neg   - TSH > 1.40  and B12 >> 164  >> would supplement for deficiency     #Hyperkalemia   #Hyponatremia  # CKD  - Cr 2.0, b/l around 1.8 >> 1.6   - K+ 5.3  >> 4.6   -     - Trend Cr     #HTN  - c/w benzapril / HCTZ 5/6.25 as lisionoril 5mg, hctz 6.25   - monitor bp  - monitor K+    #HLD  - c/w atorvastatin 20mg  - c/w asa     MISC  DVTPPX: LVNX  GIPPX: NI  CODE: FULL  DIET: DASH LOW K  Fall precaution.     Pending: Mental status improvement/ placement   Dispo: SNF

## 2025-01-20 NOTE — PROGRESS NOTE ADULT - ASSESSMENT
89 y/o M PMHx of HTN, HLD, CKD 3 p/w AMS. Found to be HD stable on RA, slightly hyponatremic and hyperkalemic, and in ailyn, cr 2.o, b/l 1.8. -ve CXR     #TME 2/2 uncomplicated cystitis, b12 deficiency, progressive dementia   #Macrocytic anemia possibly 2/2 b12 deficiency  - HD stable on RA  - CXR -ve, RVP (-)  - UA: small LE, +bacteria  - B12: 164, TSH: 1.4  - c/w CTX 1g qd x3d (1/20-1/22)  - c/w cyanocobalamin 1000mcg qd     #AILYN on CKD3 - resolved   #Hyponatremia - resolved   - Cr at baseline now s/p IVF  - avoid nephrotoxins    #HTN  #Hyperkalemia - resolved  - c/w benzapril / HCTZ 5/6.25 as lisinopril 5mg, hctz 6.25   - monitor bp  - monitor K+    #HLD  - c/w atorvastatin 20mg  - c/w asa (unclear indication)    ---------------------  DVT ppx: lovenox  Diet: DASH, low K  GI ppx/Bowel regimen: not indicated  Activity: IAT  GOC: full  Dispo: [facility]  #Summary/Handoff:  - dispo planning

## 2025-01-21 PROCEDURE — 99232 SBSQ HOSP IP/OBS MODERATE 35: CPT

## 2025-01-21 RX ADMIN — ENOXAPARIN SODIUM 40 MILLIGRAM(S): 100 INJECTION SUBCUTANEOUS at 05:52

## 2025-01-21 RX ADMIN — Medication 5 MILLIGRAM(S): at 05:53

## 2025-01-21 RX ADMIN — ACETAMINOPHEN, DIPHENHYDRAMINE HCL, PHENYLEPHRINE HCL 3 MILLIGRAM(S): 325; 25; 5 TABLET ORAL at 21:00

## 2025-01-21 RX ADMIN — ASPIRIN 81 MILLIGRAM(S): 81 TABLET, COATED ORAL at 12:30

## 2025-01-21 RX ADMIN — ATORVASTATIN CALCIUM 20 MILLIGRAM(S): 80 TABLET, FILM COATED ORAL at 21:00

## 2025-01-21 RX ADMIN — ANTISEPTIC SURGICAL SCRUB 1 APPLICATION(S): 0.04 SOLUTION TOPICAL at 05:54

## 2025-01-21 RX ADMIN — CEFTRIAXONE 100 MILLIGRAM(S): 250 INJECTION, POWDER, FOR SOLUTION INTRAMUSCULAR; INTRAVENOUS at 12:31

## 2025-01-21 RX ADMIN — Medication 1000 MICROGRAM(S): at 12:30

## 2025-01-21 NOTE — ADVANCED PRACTICE NURSE CONSULT - RECOMMEDATIONS
Maintain pressure injury prevention.   Keep skin clean.   Maintain incontinence care.   Monitor skin for changes and notify provider   Case discussed with primary RN

## 2025-01-21 NOTE — PROGRESS NOTE ADULT - ASSESSMENT
89 y/o M PMHx of HTN, HLD, CKD 3 p/w AMS. Found to be HD stable on RA, slightly hyponatremic and hyperkalemic, and in lily, cr 2.o, b/l 1.8. -ve CXR     # Altered Mental status likely metabolic encephalopathy with progressive  dementia  - improving mental status    - HD stable on RA  - CXR -ve  -  UA >> small LE, with bacteria >being  treated  as uncomplicated cystitis.  - RVP >>  Neg   - TSH > 1.40  and B12 >> 164  >>being  supplemented  for deficiency     #Hyperkalemia   #Hyponatremia  # CKD  - Cr 2.0, b/l around 1.8 >> 1.6   - K+ 5.3  >> 4.6   -     - Trend Cr     #HTN  - c/w benzapril / HCTZ 5/6.25 as lisionoril 5mg, hctz 6.25   - monitor bp  - monitor K+    #HLD  - c/w atorvastatin 20mg  - c/w asa     MISC  DVTPPX: LVNX  GIPPX: NI  CODE: FULL  DIET: DASH LOW K  Fall precaution.     Pending: Mental status improvement/ placement   Dispo: SNF in [place]

## 2025-01-21 NOTE — ADVANCED PRACTICE NURSE CONSULT - ASSESSMENT
History of Present Illness:   89 y/o M PMHx of HTN, HLD, CKD 3 p/w AMS. History acquired from granddaughter over the phone for mental status. Pt has been confused for the past month with episodes of hallucinations where he thinks people are chasing him and odd behaviors such as taking his clothes off. Family denies recent fevers, cough, sob, episodes of dysuria, hematuria recent travel, bloody/dark stools.       Patient received lying in bed. Awake. Limited mobility. Incontinent of urine and stool. High BMI. High risk for pressure injury development and progression.    Blanchable redness to bilateral heels at time of assessment.

## 2025-01-21 NOTE — PROGRESS NOTE ADULT - ASSESSMENT
89 y/o M PMHx of HTN, HLD, CKD 3 p/w AMS. Found to be HD stable on RA, slightly hyponatremic and hyperkalemic, and in ailyn, cr 2.o, b/l 1.8. -ve CXR. Likely  2/2 UTI and b12 deficiency.    #TME 2/2 uncomplicated cystitis, b12 deficiency, ?progressive dementia - improving  #Macrocytic anemia possibly 2/2 b12 deficiency  - HD stable on RA  - CXR -ve, RVP (-)  - UA: small LE, +bacteria  - B12: 164, TSH: 1.4  - c/w CTX 1g qd x3d (1/20-1/22)  - c/w cyanocobalamin 1000mcg qd     #AILYN on CKD3 - resolved   #Hyponatremia - resolved   - Cr at baseline now s/p IVF  - avoid nephrotoxins    #HTN  #Hyperkalemia - resolved  - c/w benzapril / HCTZ 5/6.25 as lisinopril 5mg, hctz 6.25   - monitor bp  - monitor K+    #HLD  - c/w atorvastatin 20mg  - c/w asa (unclear indication)    ---------------------  DVT ppx: lovenox  Diet: DASH, low K  GI ppx/Bowel regimen: not indicated  Activity: IAT  GOC: full  Dispo: [facility]  #Summary/Handoff:  - c/w abx, dispo planning

## 2025-01-22 ENCOUNTER — TRANSCRIPTION ENCOUNTER (OUTPATIENT)
Age: 89
End: 2025-01-22

## 2025-01-22 LAB
ALBUMIN SERPL ELPH-MCNC: 4 G/DL — SIGNIFICANT CHANGE UP (ref 3.5–5.2)
ALP SERPL-CCNC: 72 U/L — SIGNIFICANT CHANGE UP (ref 30–115)
ALT FLD-CCNC: 14 U/L — SIGNIFICANT CHANGE UP (ref 0–41)
ANION GAP SERPL CALC-SCNC: 18 MMOL/L — HIGH (ref 7–14)
AST SERPL-CCNC: 29 U/L — SIGNIFICANT CHANGE UP (ref 0–41)
BILIRUB SERPL-MCNC: 0.9 MG/DL — SIGNIFICANT CHANGE UP (ref 0.2–1.2)
BUN SERPL-MCNC: 35 MG/DL — HIGH (ref 10–20)
CALCIUM SERPL-MCNC: 10 MG/DL — SIGNIFICANT CHANGE UP (ref 8.4–10.5)
CHLORIDE SERPL-SCNC: 107 MMOL/L — SIGNIFICANT CHANGE UP (ref 98–110)
CO2 SERPL-SCNC: 17 MMOL/L — SIGNIFICANT CHANGE UP (ref 17–32)
CREAT SERPL-MCNC: 2 MG/DL — HIGH (ref 0.7–1.5)
EGFR: 31 ML/MIN/1.73M2 — LOW
GLUCOSE SERPL-MCNC: 77 MG/DL — SIGNIFICANT CHANGE UP (ref 70–99)
POTASSIUM SERPL-MCNC: 5.6 MMOL/L — HIGH (ref 3.5–5)
POTASSIUM SERPL-SCNC: 5.6 MMOL/L — HIGH (ref 3.5–5)
PROT SERPL-MCNC: 6.7 G/DL — SIGNIFICANT CHANGE UP (ref 6–8)
SODIUM SERPL-SCNC: 142 MMOL/L — SIGNIFICANT CHANGE UP (ref 135–146)

## 2025-01-22 PROCEDURE — 99239 HOSP IP/OBS DSCHRG MGMT >30: CPT

## 2025-01-22 RX ORDER — OLANZAPINE 10 MG/1
5 TABLET, FILM COATED ORAL ONCE
Refills: 0 | Status: COMPLETED | OUTPATIENT
Start: 2025-01-22 | End: 2025-01-22

## 2025-01-22 RX ORDER — BACTERIOSTATIC SODIUM CHLORIDE 0.9 %
1000 VIAL (ML) INJECTION
Refills: 0 | Status: DISCONTINUED | OUTPATIENT
Start: 2025-01-22 | End: 2025-01-23

## 2025-01-22 RX ORDER — ASPIRIN 81 MG/1
1 TABLET, COATED ORAL
Qty: 0 | Refills: 0 | DISCHARGE

## 2025-01-22 RX ORDER — CYANOCOBALAMIN (VITAMIN B-12) 1000MCG/ML
1 VIAL (ML) INJECTION
Qty: 0 | Refills: 0 | DISCHARGE
Start: 2025-01-22

## 2025-01-22 RX ADMIN — ATORVASTATIN CALCIUM 20 MILLIGRAM(S): 80 TABLET, FILM COATED ORAL at 21:39

## 2025-01-22 RX ADMIN — Medication 5 MILLIGRAM(S): at 05:12

## 2025-01-22 RX ADMIN — ANTISEPTIC SURGICAL SCRUB 1 APPLICATION(S): 0.04 SOLUTION TOPICAL at 05:13

## 2025-01-22 RX ADMIN — ENOXAPARIN SODIUM 40 MILLIGRAM(S): 100 INJECTION SUBCUTANEOUS at 05:13

## 2025-01-22 RX ADMIN — OLANZAPINE 5 MILLIGRAM(S): 10 TABLET, FILM COATED ORAL at 14:03

## 2025-01-22 RX ADMIN — CEFTRIAXONE 100 MILLIGRAM(S): 250 INJECTION, POWDER, FOR SOLUTION INTRAMUSCULAR; INTRAVENOUS at 13:47

## 2025-01-22 RX ADMIN — Medication 75 MILLILITER(S): at 18:57

## 2025-01-22 NOTE — DISCHARGE NOTE PROVIDER - NSDCMRMEDTOKEN_GEN_ALL_CORE_FT
aspirin 81 mg oral capsule: 1 cap(s) orally once a day  benazepril-hydrochlorothiazide 5 mg-6.25 mg oral tablet: 1 tab(s) orally once a day  cyanocobalamin 1000 mcg oral tablet: 1 tab(s) orally once a day  Lipitor 20 mg oral tablet: 1 tab(s) orally once a day

## 2025-01-22 NOTE — DISCHARGE NOTE PROVIDER - NSDCCPCAREPLAN_GEN_ALL_CORE_FT
PRINCIPAL DISCHARGE DIAGNOSIS  Diagnosis: Change in mental status  Assessment and Plan of Treatment: You came in with 1 month of altered mental status. We performed labs and imaging to determine the cause. You were found to have mild b12 deficiency and a mild UTI. We treated you with IV antibiotics and vitamin supplementation. You likely have a component of dementia as well.   Please continue taking your medications as prescribed and follow up with your PCP  Do you need a primary care doctor or follow-up with a specialist? Our care coordinators will help you find providers near you and schedule any follow-up care visits.  Monday-Friday: 9am-5pm  Call our [facility] team: [phone]        SECONDARY DISCHARGE DIAGNOSES  Diagnosis: Vitamin B12 deficiency  Assessment and Plan of Treatment:     Diagnosis: Acute cystitis  Assessment and Plan of Treatment:

## 2025-01-22 NOTE — DISCHARGE NOTE PROVIDER - ATTENDING DISCHARGE PHYSICAL EXAMINATION:
T(F): 97.8 (01-22-25 @ 05:02), Max: 97.8 (01-22-25 @ 05:02)  HR: 68 (01-22-25 @ 12:03) (61 - 80)  BP: 145/73 (01-22-25 @ 12:03) (141/100 - 145/73)  RR: 18 (01-22-25 @ 12:03) (18 - 18)  SpO2: 100% (01-22-25 @ 12:03) (97% - 100%)  GENERAL: NAD  HEENT:  +seborrheic dermatitis, EOMI, Moist mucous membranes  CHEST/LUNG:  normal respiratory effort, no coughing, CTA b/l  ABDOMEN: soft, +suprapubic TTP, nondistended  EXTREMITIES: No lower extremity edema bilaterally  NERVOUS SYSTEM:  A&Ox2, no focal deficits

## 2025-01-22 NOTE — DISCHARGE NOTE PROVIDER - NSDCFUSCHEDAPPT_GEN_ALL_CORE_FT
Lorraine Young  [facility] Physician [facility]  NEUROLOGY [address] [facility]  Scheduled Appointment: 03/06/2025

## 2025-01-22 NOTE — DISCHARGE NOTE PROVIDER - CARE PROVIDER_API CALL
Casimiro Yang  Internal Medicine  [address] [place] [address]  Phone: [phone]  Fax: [phone]  Follow Up Time: 1 week

## 2025-01-22 NOTE — DISCHARGE NOTE PROVIDER - NSDCACTIVITY_GEN_ALL_CORE
Do not drive or operate machinery/Do not make important decisions/No heavy lifting/straining/Activity as tolerated Female

## 2025-01-22 NOTE — DISCHARGE NOTE PROVIDER - HOSPITAL COURSE
HPI:  89 y/o M PMHx of HTN, HLD, CKD 3 p/w AMS. History acquired from granddaughter over the phone for mental status. Pt has been confused for the past month with episodes of hallucinations where he thinks people are chasing him and odd behaviors such as taking his clothes off. Family denies recent fevers, cough, sob, episodes of dysuria, hematuria recent travel, bloody/dark stools.       Vitals:  T(F): 97.4 / HR: 74 / BP: 152/68 / RR: 18 / SpO2: 98%   SIG labs: HGB 11.7, bl, , K+ 5.3 on VBG, Cr 2.0, b/l 1.8, PCO2 35  Imaging: CXR -ve, CTH stable moderate to severe chronic microvascular changes with parenchymal atrophy        (16 Jan 2025 22:02)    Hospital Course:  Patient was admitted for toxic metabolic encephalopathy. TSH wnl, RPR wnl, B12 164, UA +small LE and occasional bacteria. BCx NGTD. Patient was treated with 3 days of IV CTX for presumed UTI and supplemented with cyanocobalamin for B12 deficiency. Patient's mental status continued to wax and wane however mildly improved with treatment, likely a component of progressive dementia as well. PT evaluated patient and recommending [facility].    Patient is medically and hemodynamically stable, ready for discharge.    Discussion of discharge plan of care, including discharge diagnoses, medication reconciliation, and follow-ups was conducted with Dr. Art on 1/22/2025 at 11AM, and discharge was approved.      Important Medication Changes and Reason:  [] cyanocobalamin 1000mcg qd     Active or Pending Issues Requiring Follow-up:  [] f/u PCP, likely would benefit from neuropsychiatric evaluation outpatient    Discharge Diagnoses:  [] Altered mental status   [] Uncomplicated cystitis  [] B12 deficiency         HPI:  89 y/o M PMHx of HTN, HLD, CKD 3 p/w AMS. History acquired from granddaughter over the phone for mental status. Pt has been confused for the past month with episodes of hallucinations where he thinks people are chasing him and odd behaviors such as taking his clothes off. Family denies recent fevers, cough, sob, episodes of dysuria, hematuria recent travel, bloody/dark stools.       Vitals:  T(F): 97.4 / HR: 74 / BP: 152/68 / RR: 18 / SpO2: 98%   SIG labs: HGB 11.7, bl, , K+ 5.3 on VBG, Cr 2.0, b/l 1.8, PCO2 35  Imaging: CXR -ve, CTH stable moderate to severe chronic microvascular changes with parenchymal atrophy        (16 Jan 2025 22:02)    Hospital Course:  Patient was admitted for toxic metabolic encephalopathy. TSH wnl, RPR wnl, B12 164, UA +small LE and occasional bacteria. BCx NGTD. Patient was treated with 3 days of IV CTX for presumed UTI and supplemented with cyanocobalamin for B12 deficiency. Course complicated by AILYN improved s/p IVF. Patient's mental status continued to wax and wane however mildly improved with treatment, likely a component of hospital acquired delirium and progressive dementia as well. PT evaluated patient and recommending [facility].    Patient is medically and hemodynamically stable, ready for discharge.    Discussion of discharge plan of care, including discharge diagnoses, medication reconciliation, and follow-ups was conducted with Dr. Art on 1/22/2025 at 11AM, and discharge was approved.      Important Medication Changes and Reason:  [] cyanocobalamin 1000mcg qd     Active or Pending Issues Requiring Follow-up:  [] f/u PCP, likely would benefit from neuropsychiatric evaluation outpatient    Discharge Diagnoses:  [] Altered mental status   [] Uncomplicated cystitis  [] B12 deficiency

## 2025-01-22 NOTE — PROGRESS NOTE ADULT - ASSESSMENT
89 y/o M PMHx of HTN, HLD, CKD 3 p/w AMS. Found to be HD stable on RA, slightly hyponatremic and hyperkalemic, and in ailyn, cr 2.o, b/l 1.8. -ve CXR. Likely  2/2 UTI and b12 deficiency.    #TME 2/2 uncomplicated cystitis, b12 deficiency, ?progressive dementia - stable  #Macrocytic anemia possibly 2/2 b12 deficiency  - HD stable on RA  - CXR -ve, RVP (-)  - UA: small LE, +bacteria  - B12: 164, TSH: 1.4  - c/w CTX 1g qd x3d (1/20-1/22)  - c/w cyanocobalamin 1000mcg qd     #AILYN on CKD3 - resolved   #Hyponatremia - resolved   - Cr at baseline now s/p IVF  - avoid nephrotoxins    #HTN  #Hyperkalemia - resolved  - c/w benzapril / HCTZ 5/6.25 as lisinopril 5mg, hctz 6.25   - monitor bp  - monitor K+    #HLD  - c/w atorvastatin 20mg  - c/w asa (unclear indication)    ---------------------  DVT ppx: lovenox  Diet: DASH, low K  GI ppx/Bowel regimen: not indicated  Activity: IAT  GOC: full  Dispo: [facility]  #Summary/Handoff:  - c/w abx, dispo planning

## 2025-01-23 ENCOUNTER — TRANSCRIPTION ENCOUNTER (OUTPATIENT)
Age: 89
End: 2025-01-23

## 2025-01-23 VITALS
OXYGEN SATURATION: 95 % | HEART RATE: 64 BPM | SYSTOLIC BLOOD PRESSURE: 130 MMHG | DIASTOLIC BLOOD PRESSURE: 58 MMHG | TEMPERATURE: 97 F | RESPIRATION RATE: 18 BRPM

## 2025-01-23 LAB
ANION GAP SERPL CALC-SCNC: 17 MMOL/L — HIGH (ref 7–14)
BUN SERPL-MCNC: 42 MG/DL — HIGH (ref 10–20)
CALCIUM SERPL-MCNC: 9.6 MG/DL — SIGNIFICANT CHANGE UP (ref 8.4–10.4)
CHLORIDE SERPL-SCNC: 109 MMOL/L — SIGNIFICANT CHANGE UP (ref 98–110)
CO2 SERPL-SCNC: 19 MMOL/L — SIGNIFICANT CHANGE UP (ref 17–32)
CREAT SERPL-MCNC: 1.9 MG/DL — HIGH (ref 0.7–1.5)
EGFR: 33 ML/MIN/1.73M2 — LOW
GLUCOSE SERPL-MCNC: 71 MG/DL — SIGNIFICANT CHANGE UP (ref 70–99)
MAGNESIUM SERPL-MCNC: 2.2 MG/DL — SIGNIFICANT CHANGE UP (ref 1.8–2.4)
POTASSIUM SERPL-MCNC: 5 MMOL/L — SIGNIFICANT CHANGE UP (ref 3.5–5)
POTASSIUM SERPL-SCNC: 5 MMOL/L — SIGNIFICANT CHANGE UP (ref 3.5–5)
SODIUM SERPL-SCNC: 145 MMOL/L — SIGNIFICANT CHANGE UP (ref 135–146)

## 2025-01-23 PROCEDURE — 99232 SBSQ HOSP IP/OBS MODERATE 35: CPT

## 2025-01-23 RX ORDER — BACTERIOSTATIC SODIUM CHLORIDE 0.9 %
1000 VIAL (ML) INJECTION
Refills: 0 | Status: DISCONTINUED | OUTPATIENT
Start: 2025-01-23 | End: 2025-01-23

## 2025-01-23 RX ADMIN — ASPIRIN 81 MILLIGRAM(S): 81 TABLET, COATED ORAL at 12:12

## 2025-01-23 RX ADMIN — ENOXAPARIN SODIUM 40 MILLIGRAM(S): 100 INJECTION SUBCUTANEOUS at 06:41

## 2025-01-23 RX ADMIN — ANTISEPTIC SURGICAL SCRUB 1 APPLICATION(S): 0.04 SOLUTION TOPICAL at 06:43

## 2025-01-23 RX ADMIN — Medication 5 MILLIGRAM(S): at 06:40

## 2025-01-23 RX ADMIN — ANTISEPTIC SURGICAL SCRUB 1 APPLICATION(S): 0.04 SOLUTION TOPICAL at 06:42

## 2025-01-23 RX ADMIN — Medication 75 MILLILITER(S): at 12:11

## 2025-01-23 RX ADMIN — Medication 1000 MICROGRAM(S): at 12:12

## 2025-01-23 NOTE — DISCHARGE NOTE NURSING/CASE MANAGEMENT/SOCIAL WORK - PATIENT PORTAL LINK FT
You can access the FollowMyHealth Patient Portal offered by [facility] by registering at the following website: [email or web address]. By joining [employer]’s FollowMyHealth portal, you will also be able to view your health information using other applications (apps) compatible with our system.

## 2025-01-23 NOTE — PROGRESS NOTE ADULT - ASSESSMENT
89 y/o M PMHx of HTN, HLD, CKD 3 p/w AMS. Found to be HD stable on RA, slightly hyponatremic and hyperkalemic, and in ailyn, cr 2.o, b/l 1.8. -ve CXR. Likely  2/2 UTI and b12 deficiency.    #TME 2/2 uncomplicated cystitis, b12 deficiency, ?progressive dementia, hospital acquired delirium - stable  #Macrocytic anemia possibly 2/2 b12 deficiency  - HD stable on RA  - CXR -ve, RVP (-)  - UA: small LE, +bacteria  - B12: 164, TSH: 1.4  - c/w CTX 1g qd x3d (1/20-1/22)  - c/w cyanocobalamin 1000mcg qd     #AILYN on CKD3 - resolved   #Hyponatremia - resolved   - Cr 2.0 on admission, baseline 1.6, improved s/p IVF  - worsened again, when pt is agitated he does not eat, MS waxes and wanes, again improving w/ IVF     #HTN  #Hyperkalemia - resolved  - c/w benzapril / HCTZ 5/6.25 as lisinopril 5mg, hctz 6.25   - monitor bp  - monitor K+    #HLD  - c/w atorvastatin 20mg  - c/w asa     ---------------------  DVT ppx: lovenox  Diet: DASH, low K  GI ppx/Bowel regimen: not indicated  Activity: IAT  GOC: full  Dispo: [facility]  #Summary/Handoff:  - dispo planning   89 y/o M PMHx of HTN, HLD, CKD 3 p/w AMS. Found to be HD stable on RA, slightly hyponatremic and hyperkalemic, and in aliyn, cr 2.o, b/l 1.8. -ve CXR. Likely  2/2 UTI and b12 deficiency.    #TME 2/2 uncomplicated cystitis, b12 deficiency, ?progressive dementia, hospital acquired delirium - stable  #Macrocytic anemia possibly 2/2 b12 deficiency  - HD stable on RA  - CXR -ve, RVP (-)  - UA: small LE, +bacteria  - B12: 164, TSH: 1.4  - c/w CTX 1g qd x3d (1/20-1/22)  - c/w cyanocobalamin 1000mcg qd     #AILYN on CKD3 - resolved   #Hyponatremia - resolved   - Cr 2.0 on admission, baseline 1.6, improved s/p IVF  - when pt is agitated he does not eat, MS waxes and wanes, today is better    #HTN  #Hyperkalemia - resolved  - c/w benzapril / HCTZ 5/6.25 as lisinopril 5mg, hctz 6.25   - monitor bp  - monitor K+    #HLD  - c/w atorvastatin 20mg  - c/w asa     ---------------------  DVT ppx: lovenox  Diet: DASH, low K  GI ppx/Bowel regimen: not indicated  Activity: IAT  GOC: full  Dispo: [facility]  #Summary/Handoff:  - dispo planning

## 2025-01-23 NOTE — DISCHARGE NOTE NURSING/CASE MANAGEMENT/SOCIAL WORK - FINANCIAL ASSISTANCE
[facility] provides services at a reduced cost to those who are determined to be eligible through [facility]’s financial assistance program. Information regarding [facility]’s financial assistance program can be found by going to [email or web address] or by calling [phone].

## 2025-01-23 NOTE — PROGRESS NOTE ADULT - ATTENDING COMMENTS
89 y/o M PMHx of HTN, HLD, CKD 3 p/w AMS. Found to be HD stable on RA, slightly hyponatremic and hyperkalemic, and in ailyn, cr 2.o, b/l 1.8. -ve CXR. Likely  2/2 UTI and b12 deficiency.    #TME 2/2 uncomplicated cystitis, b12 deficiency, ?progressive dementia, hospital acquired delirium - stable  #Macrocytic anemia possibly 2/2 b12 deficiency  - HD stable on RA  - CXR -ve, RVP (-)  - UA: small LE, +bacteria  - B12: 164, TSH: 1.4  - c/w CTX 1g qd x3d (1/20-1/22)  - c/w cyanocobalamin 1000mcg qd     #AILYN on CKD3 - stable   creatinine trend  1.9 (01-23-25 @ 06:18)  2.0 (01-22-25 @ 06:17)  1.6 (01-20-25 @ 12:35)    Sodium: 145 mmol/L (01-23-25 @ 06:18)  Sodium: 142 mmol/L (01-22-25 @ 06:17)  Sodium: 139 mmol/L (01-20-25 @ 12:35)    #HTN  #Hyperkalemia - resolved  - c/w benzapril / HCTZ 5/6.25 as lisinopril 5mg, hctz 6.25   - monitor bp  - monitor K+    #HLD  - c/w atorvastatin 20mg  - c/w asa     Patient is medically and hemodynamically stable, ready for discharge on 1/22 but family was not agreeable, today the granddaughter is agreeable with dc to SNF  time spent  35 min

## 2025-01-23 NOTE — DISCHARGE NOTE NURSING/CASE MANAGEMENT/SOCIAL WORK - NSDCPEFALRISK_GEN_ALL_CORE
For information on Fall & Injury Prevention, visit: [email or web address] OR  [email or web address] OR  https://www.cdc.gov/steadi/patient.html

## 2025-01-23 NOTE — PROGRESS NOTE ADULT - SUBJECTIVE AND OBJECTIVE BOX
DONNELLJESÚS  89y  Male      Patient is a 89y old  Male who presents with a chief complaint of altered mental status.     INTERVAL HPI/OVERNIGHT EVENTS:      ******************************* REVIEW OF SYSTEMS:**********************************************    All other review of systems negative    *********************** VITALS ******************************************    T(F): 98.6 (25 @ 05:33)  HR: 76 (25 @ 05:33) (64 - 76)  BP: 179/79 (25 @ 05:33) (145/89 - 179/79)  RR: 18 (25 @ 05:33) (18 - 18)  SpO2: 96% (25 @ 20:25) (96% - 96%)            ******************************** PHYSICAL EXAM:**************************************************  GENERAL: NAD    PSYCH: no agitation, HEENT:     NERVOUS SYSTEM:  Alert & awake x 2, but appears to be delirious     PULMONARY: AMRITA, CTA    CARDIOVASCULAR: S1S2 RRR    GI: Soft, NT, ND; BS present.    EXTREMITIES:  2+ Peripheral Pulses, No clubbing, cyanosis, or edema    LYMPH: No lymphadenopathy noted    SKIN: No rashes or lesions      **************************** LABS *******************************************************                          10.7   6.82  )-----------( 178      ( 2025 06:25 )             33.4         139  |  107  |  28[H]  ----------------------------<  86  4.6   |  24  |  1.6[H]    Ca    9.1      2025 06:25  Mg     2.1         TPro  5.6[L]  /  Alb  3.7  /  TBili  1.1  /  DBili  x   /  AST  12  /  ALT  11  /  AlkPhos  60        Urinalysis Basic - ( 2025 22:59 )    Color: Yellow / Appearance: Clear / S.015 / pH: x  Gluc: x / Ketone: Negative mg/dL  / Bili: Negative / Urobili: 0.2 mg/dL   Blood: x / Protein: Negative mg/dL / Nitrite: Negative   Leuk Esterase: Small / RBC: 3 /HPF / WBC 2 /HPF   Sq Epi: x / Non Sq Epi: 2 /HPF / Bacteria: Occasional /HPF        Lactate Trend        CAPILLARY BLOOD GLUCOSE              **************************Active Medications *******************************************  No Known Allergies      aspirin  chewable 81 milliGRAM(s) Oral daily  atorvastatin 20 milliGRAM(s) Oral at bedtime  cyanocobalamin 1000 MICROGram(s) Oral daily  enoxaparin Injectable 40 milliGRAM(s) SubCutaneous every 24 hours  hydrochlorothiazide 6.25 milliGRAM(s) Oral daily  lisinopril 5 milliGRAM(s) Oral daily  melatonin 3 milliGRAM(s) Oral at bedtime PRN  sodium chloride 0.9%. 1000 milliLiter(s) IV Continuous <Continuous>      ***************************************************  RADIOLOGY & ADDITIONAL TESTS:    Imaging Personally Reviewed:  [ ] YES  [ ] NO    HEALTH ISSUES - PROBLEM Dx:      
Patient is a 89y old Male who presents with a chief complaint of   Today is hospital day     Overnight events/Interval History:  - No acute overnight events  - At bedside, patient is A&Ox3, more pleasant than yesterday and feeling well. Breathing comfortably on room air.     ROS:  - negative unless indicated in HPI    Code Status: full    ALLERGIES:  No Known Allergies    MEDICATIONS:  STANDING MEDICATIONS  aspirin  chewable 81 milliGRAM(s) Oral daily  atorvastatin 20 milliGRAM(s) Oral at bedtime  chlorhexidine 2% Cloths 1 Application(s) Topical <User Schedule>  chlorhexidine 2% Cloths 1 Application(s) Topical <User Schedule>  cyanocobalamin 1000 MICROGram(s) Oral daily  enoxaparin Injectable 40 milliGRAM(s) SubCutaneous every 24 hours  hydrochlorothiazide 6.25 milliGRAM(s) Oral daily  lisinopril 5 milliGRAM(s) Oral daily  sodium chloride 0.9%. 1000 milliLiter(s) IV Continuous <Continuous>    PRN MEDICATIONS  melatonin 3 milliGRAM(s) Oral at bedtime PRN    VITALS LAST 24H:  Vital Signs Last 24 Hrs  T(C): 36.2 (23 Jan 2025 05:00), Max: 36.3 (22 Jan 2025 20:00)  T(F): 97.1 (23 Jan 2025 05:00), Max: 97.4 (22 Jan 2025 20:00)  HR: 70 (23 Jan 2025 05:00) (68 - 71)  BP: 157/74 (23 Jan 2025 05:00) (145/73 - 157/74)  BP(mean): --  RR: 18 (23 Jan 2025 05:00) (18 - 18)  SpO2: 98% (23 Jan 2025 05:00) (98% - 100%)        PHYSICAL EXAM:  GENERAL: NAD  HEENT:  +seborrheic dermatitis, EOMI, Moist mucous membranes  CHEST/LUNG:  normal respiratory effort, no coughing, CTA b/l  ABDOMEN: soft, +suprapubic TTP, nondistended  EXTREMITIES: No lower extremity edema bilaterally  NERVOUS SYSTEM:  A&Ox2-3, no focal deficits    LABS:    01-23    145  |  109  |  42[H]  ----------------------------<  71  5.0   |  19  |  1.9[H]    Ca    9.6      23 Jan 2025 06:18  Mg     2.2     01-23    TPro  6.7  /  Alb  4.0  /  TBili  0.9  /  DBili  x   /  AST  29  /  ALT  14  /  AlkPhos  72  01-22      Urinalysis Basic - ( 23 Jan 2025 06:18 )    Color: x / Appearance: x / SG: x / pH: x  Gluc: 71 mg/dL / Ketone: x  / Bili: x / Urobili: x   Blood: x / Protein: x / Nitrite: x   Leuk Esterase: x / RBC: x / WBC x   Sq Epi: x / Non Sq Epi: x / Bacteria: x                RADIOLOGY:  CXR      CT                    
SUBJECTIVE/OVERNIGHT EVENTS  Today is hospital day 1d. This morning patient was seen and examined at bedside, resting comfortably in bed. No acute or major events overnight. patient is AAOx2. Patient ambulates with a walker at baseline.    HOSPITAL COURSE  89 y/o M PMHx of HTN, HLD, CKD 3 p/w AMS. History acquired from granddaughter over the phone for mental status. Pt has been confused for the past month with episodes of hallucinations where he thinks people are chasing him and odd behaviors such as taking his clothes off. Family denies recent fevers, cough, sob, episodes of dysuria, hematuria recent travel, bloody/dark stools.     Vitals:  T(F): 97.4   HR: 74   BP: 152/68   RR: 18   SpO2: 98%     SIG labs: HGB 11.7, bl, , K+ 5.3 on VBG, Cr 2.0, b/l 1.8, PCO2 35    Imaging:  CXR -ve       MEDICATIONS  STANDING MEDICATIONS  aspirin  chewable 81 milliGRAM(s) Oral daily  atorvastatin 20 milliGRAM(s) Oral at bedtime  enoxaparin Injectable 40 milliGRAM(s) SubCutaneous every 24 hours  hydrochlorothiazide 6.25 milliGRAM(s) Oral daily  lisinopril 5 milliGRAM(s) Oral daily  sodium chloride 0.9%. 1000 milliLiter(s) IV Continuous <Continuous>    PRN MEDICATIONS  melatonin 3 milliGRAM(s) Oral at bedtime PRN    VITALS  T(F): 97.6 (01-17-25 @ 07:53), Max: 97.6 (01-16-25 @ 13:23)  HR: 54 (01-17-25 @ 07:53) (54 - 74)  BP: 132/78 (01-17-25 @ 07:53) (129/68 - 165/76)  RR: 18 (01-17-25 @ 07:53) (18 - 19)  SpO2: 98% (01-17-25 @ 07:53) (98% - 100%)    PHYSICAL EXAM  GENERAL: NAD, lying in bed comfortably  HEAD:  Atraumatic, normocephalic  EYES: EOMI, PERRL  NECK: Supple, trachea midline, no JVD  HEART: Regular rate and rhythm  LUNGS: Unlabored respirations.  Clear to auscultation bilaterally, no crackles, wheezing, or rhonchi  ABDOMEN: Soft, nontender, nondistended, +BS  EXTREMITIES: 2+ peripheral pulses bilaterally. No clubbing, cyanosis, or edema. Bruises over the extremities   NERVOUS SYSTEM:  A&Ox2, moving all extremities, no focal deficits     LABS             10.4   5.69  )-----------( 219      ( 01-17-25 @ 08:01 )             32.4     137  |  106  |  33  -------------------------<  89   01-17-25 @ 08:01  4.8  |  20  |  1.7    Ca      9.6     01-17-25 @ 08:01  Phos   3.3     01-17-25 @ 08:01  Mg     2.1     01-17-25 @ 08:01    TPro  6.1  /  Alb  3.9  /  TBili  0.9  /  DBili  x   /  AST  14  /  ALT  12  /  AlkPhos  68  /  GGT  x     01-17-25 @ 08:01        Urinalysis Basic - ( 17 Jan 2025 08:01 )    Color: x / Appearance: x / SG: x / pH: x  Gluc: 89 mg/dL / Ketone: x  / Bili: x / Urobili: x   Blood: x / Protein: x / Nitrite: x   Leuk Esterase: x / RBC: x / WBC x   Sq Epi: x / Non Sq Epi: x / Bacteria: x          IMAGING
SUBJECTIVE/OVERNIGHT EVENTS  Today is hospital day 3d. This morning patient was seen and examined at bedside, resting comfortably in bed. No acute or major events overnight.    HPI:  89 y/o M PMHx of HTN, HLD, CKD 3 p/w AMS. History acquired from granddaughter over the phone for mental status. Pt has been confused for the past month with episodes of hallucinations where he thinks people are chasing him and odd behaviors such as taking his clothes off. Family denies recent fevers, cough, sob, episodes of dysuria, hematuria recent travel, bloody/dark stools.       Vitals:  T(F): 97.4   HR: 74   BP: 152/68   RR: 18   SpO2: 98%     SIG labs: HGB 11.7, bl, , K+ 5.3 on VBG, Cr 2.0, b/l 1.8, PCO2 35    Imaging:  CXR -ve        (2025 22:02)      MEDICATIONS  STANDING MEDICATIONS  aspirin  chewable 81 milliGRAM(s) Oral daily  atorvastatin 20 milliGRAM(s) Oral at bedtime  cyanocobalamin 1000 MICROGram(s) Oral daily  enoxaparin Injectable 40 milliGRAM(s) SubCutaneous every 24 hours  hydrochlorothiazide 6.25 milliGRAM(s) Oral daily  lisinopril 5 milliGRAM(s) Oral daily  sodium chloride 0.9%. 1000 milliLiter(s) IV Continuous <Continuous>    PRN MEDICATIONS  melatonin 3 milliGRAM(s) Oral at bedtime PRN    VITALS  T(F): 97.7 (25 @ 20:25), Max: 98 (25 @ 05:33)  HR: 64 (25 @ 20:25) (62 - 65)  BP: 145/89 (25 @ 20:25) (124/69 - 153/73)  RR: 18 (25 @ 20:25) (18 - 19)  SpO2: 96% (25 @ 20:25) (96% - 97%)          PHYSICAL EXAM      GENERAL: No acute distress, well-developed  HEAD:  Atraumatic, Normocephalic  ENT: PERRL, conjunctiva and sclera clear, neck supple, no JVD, moist mucosa, posterior oropharynx clear  CHEST/LUNG: Clear to auscultation bilaterally; No wheeze, equal breath sounds bilaterally, respirations nonlabored  HEART: Regular rate and rhythm; No murmurs, rubs, or gallops  ABDOMEN: Soft, nontender, nondistended; Bowel sounds present, no organomegaly  BACK: no spinal tenderness, no CVA tenderness  EXTREMITIES:  No clubbing, cyanosis, or edema  PSYCH: Nl behavior, nl affect  NEUROLOGY: AAOx3, non-focal, moves all extremities spontaneously  SKIN: Normal color, No rashes or lesions        PAST MEDICAL & SURGICAL HISTORY:  Dementia      Dementia      High cholesterol            LABS             10.7   6.82  )-----------( 178      ( 25 @ 06:25 )             33.4     139  |  107  |  28  -------------------------<  86   25 @ 06:25  4.6  |  24  |  1.6    Ca      9.1     25 @ 06:25  Phos   3.3     25 @ 08:01  Mg     2.1     25 @ 06:25    TPro  5.6  /  Alb  3.7  /  TBili  1.1  /  DBili  x   /  AST  12  /  ALT  11  /  AlkPhos  60  /  GGT  x     25 @ 06:25        Urinalysis Basic - ( 2025 22:59 )    Color: Yellow / Appearance: Clear / S.015 / pH: x  Gluc: x / Ketone: Negative mg/dL  / Bili: Negative / Urobili: 0.2 mg/dL   Blood: x / Protein: Negative mg/dL / Nitrite: Negative   Leuk Esterase: Small / RBC: 3 /HPF / WBC 2 /HPF   Sq Epi: x / Non Sq Epi: 2 /HPF / Bacteria: Occasional /HPF          IMAGING
Patient is a 89y old Male who presents with a chief complaint of   Today is hospital day     Overnight events/Interval History:  - No acute overnight events  - At bedside, patient is A&Ox2, patient appears confused. Breathing comfortably on room air.     ROS:  - limited 2/2 mental status    Code Status: full    ALLERGIES:  No Known Allergies      MEDICATIONS:  STANDING MEDICATIONS  aspirin  chewable 81 milliGRAM(s) Oral daily  atorvastatin 20 milliGRAM(s) Oral at bedtime  cefTRIAXone   IVPB      cefTRIAXone   IVPB 1000 milliGRAM(s) IV Intermittent every 24 hours  chlorhexidine 2% Cloths 1 Application(s) Topical <User Schedule>  chlorhexidine 2% Cloths 1 Application(s) Topical <User Schedule>  cyanocobalamin 1000 MICROGram(s) Oral daily  enoxaparin Injectable 40 milliGRAM(s) SubCutaneous every 24 hours  hydrochlorothiazide 6.25 milliGRAM(s) Oral daily  lisinopril 5 milliGRAM(s) Oral daily  sodium chloride 0.9%. 1000 milliLiter(s) IV Continuous <Continuous>    PRN MEDICATIONS  melatonin 3 milliGRAM(s) Oral at bedtime PRN    VITALS LAST 24H:  Vital Signs Last 24 Hrs  T(C): 36.6 (22 Jan 2025 05:02), Max: 36.6 (22 Jan 2025 05:02)  T(F): 97.8 (22 Jan 2025 05:02), Max: 97.8 (22 Jan 2025 05:02)  HR: 61 (22 Jan 2025 05:02) (54 - 80)  BP: 144/72 (22 Jan 2025 05:02) (141/100 - 159/78)  BP(mean): --  RR: 18 (22 Jan 2025 05:02) (18 - 18)  SpO2: 98% (22 Jan 2025 05:02) (97% - 98%)      PHYSICAL EXAM:  GENERAL: NAD  HEENT:  +seborrheic dermatitis, EOMI, Moist mucous membranes  CHEST/LUNG:  normal respiratory effort, no coughing, CTA b/l  ABDOMEN: soft, +suprapubic TTP, nondistended  EXTREMITIES: No lower extremity edema bilaterally  NERVOUS SYSTEM:  A&Ox2, no focal deficits    LABS:              
Patient is a 89y old Male who presents with a chief complaint of   Today is hospital day     Overnight events/Interval History:  - No acute overnight events  - At bedside, patient is A&Ox3, mental status much improved from yesterday, has no acute concerns. Breathing comfortably on room air.     ROS:  - All ROS is negative unless indicated in HPI    Code Status: full    ALLERGIES:  No Known Allergies        MEDICATIONS:  STANDING MEDICATIONS  aspirin  chewable 81 milliGRAM(s) Oral daily  atorvastatin 20 milliGRAM(s) Oral at bedtime  cefTRIAXone   IVPB      cefTRIAXone   IVPB 1000 milliGRAM(s) IV Intermittent every 24 hours  chlorhexidine 2% Cloths 1 Application(s) Topical <User Schedule>  chlorhexidine 2% Cloths 1 Application(s) Topical <User Schedule>  cyanocobalamin 1000 MICROGram(s) Oral daily  enoxaparin Injectable 40 milliGRAM(s) SubCutaneous every 24 hours  hydrochlorothiazide 6.25 milliGRAM(s) Oral daily  lisinopril 5 milliGRAM(s) Oral daily  sodium chloride 0.9%. 1000 milliLiter(s) IV Continuous <Continuous>    PRN MEDICATIONS  melatonin 3 milliGRAM(s) Oral at bedtime PRN    VITALS LAST 24H:  Vital Signs Last 24 Hrs  T(C): 36.8 (2025 05:00), Max: 36.8 (2025 05:00)  T(F): 98.2 (2025 05:00), Max: 98.2 (2025 05:00)  HR: 60 (2025 05:00) (60 - 88)  BP: 185/91 (2025 05:00) (121/71 - 185/91)  BP(mean): --  RR: 18 (2025 05:00) (18 - 18)  SpO2: 96% (2025 05:00) (94% - 97%)    Parameters below as of 2025 20:30  Patient On (Oxygen Delivery Method): room air      PHYSICAL EXAM:  GENERAL: NAD  HEENT:  EOMI, Moist mucous membranes  CHEST/LUNG:  normal respiratory effort, no coughing, CTA b/l  ABDOMEN: soft, nontender, nondistended  EXTREMITIES: No lower extremity edema bilaterally  NERVOUS SYSTEM:  A&Ox3, no focal deficits    LABS:            Urinalysis Basic - ( 2025 22:59 )    Color: Yellow / Appearance: Clear / S.015 / pH: x  Gluc: x / Ketone: Negative mg/dL  / Bili: Negative / Urobili: 0.2 mg/dL   Blood: x / Protein: Negative mg/dL / Nitrite: Negative   Leuk Esterase: Small / RBC: 3 /HPF / WBC 2 /HPF   Sq Epi: x / Non Sq Epi: 2 /HPF / Bacteria: Occasional /HPF          
  DONNELLJESÚS  89y  Male      Patient is a 89y old  Male who presents with a chief complaint of confusion.     INTERVAL HPI/OVERNIGHT EVENTS:      ******************************* REVIEW OF SYSTEMS:**********************************************    All other review of systems negative    *********************** VITALS ******************************************    T(F): 98 (01-18-25 @ 05:33)  HR: 62 (01-18-25 @ 05:33) (54 - 62)  BP: 153/73 (01-18-25 @ 05:33) (125/71 - 153/73)  RR: 19 (01-18-25 @ 05:33) (18 - 20)  SpO2: 96% (01-18-25 @ 05:33) (96% - 99%)            ******************************** PHYSICAL EXAM:**************************************************  GENERAL: NAD    PSYCH: no agitation,   HEENT:     NERVOUS SYSTEM:  Alert & Oriented X 2-3    PULMONARY: AMRITA, CTA    CARDIOVASCULAR: S1S2 RRR    GI: Soft, NT, ND; BS present.    EXTREMITIES:  2+ Peripheral Pulses, No clubbing, cyanosis, or edema    LYMPH: No lymphadenopathy noted    SKIN: No rashes or lesions      **************************** LABS *******************************************************                          10.7   6.82  )-----------( 178      ( 18 Jan 2025 06:25 )             33.4     01-18    139  |  107  |  28[H]  ----------------------------<  86  4.6   |  24  |  1.6[H]    Ca    9.1      18 Jan 2025 06:25  Phos  3.3     01-17  Mg     2.1     01-18    TPro  5.6[L]  /  Alb  3.7  /  TBili  1.1  /  DBili  x   /  AST  12  /  ALT  11  /  AlkPhos  60  01-18      Urinalysis Basic - ( 18 Jan 2025 06:25 )    Color: x / Appearance: x / SG: x / pH: x  Gluc: 86 mg/dL / Ketone: x  / Bili: x / Urobili: x   Blood: x / Protein: x / Nitrite: x   Leuk Esterase: x / RBC: x / WBC x   Sq Epi: x / Non Sq Epi: x / Bacteria: x        Lactate Trend        CAPILLARY BLOOD GLUCOSE              **************************Active Medications *******************************************  No Known Allergies      aspirin  chewable 81 milliGRAM(s) Oral daily  atorvastatin 20 milliGRAM(s) Oral at bedtime  cyanocobalamin 1000 MICROGram(s) Oral daily  enoxaparin Injectable 40 milliGRAM(s) SubCutaneous every 24 hours  hydrochlorothiazide 6.25 milliGRAM(s) Oral daily  lisinopril 5 milliGRAM(s) Oral daily  melatonin 3 milliGRAM(s) Oral at bedtime PRN  sodium chloride 0.9%. 1000 milliLiter(s) IV Continuous <Continuous>      ***************************************************  RADIOLOGY & ADDITIONAL TESTS:    Imaging Personally Reviewed:  [ ] YES  [ ] NO    HEALTH ISSUES - PROBLEM Dx:      
  JESÚS JACOBO  89y  Male      Patient is a 89y old  Male who presents with a chief complaint of altered mental status     INTERVAL HPI/OVERNIGHT EVENTS:      ******************************* REVIEW OF SYSTEMS:**********************************************    All other review of systems negative    *********************** VITALS ******************************************    T(F): 97 (01-21-25 @ 12:58)  HR: 54 (01-21-25 @ 12:58) (54 - 83)  BP: 159/78 (01-21-25 @ 12:58) (147/70 - 185/91)  RR: 18 (01-21-25 @ 12:58) (18 - 18)  SpO2: 97% (01-21-25 @ 12:58) (96% - 97%)    01-20-25 @ 07:01  -  01-21-25 @ 07:00  --------------------------------------------------------  IN: 0 mL / OUT: 200 mL / NET: -200 mL            01-20-25 @ 07:01  -  01-21-25 @ 07:00  --------------------------------------------------------  IN: 0 mL / OUT: 200 mL / NET: -200 mL        ******************************** PHYSICAL EXAM:**************************************************  GENERAL: NAD    PSYCH: no agitation, HEENT:     NERVOUS SYSTEM:  Alert & Oriented X 2-3, improving mentation   PULMONARY: AMRITA, CTA    CARDIOVASCULAR: S1S2 RRR    GI: Soft, NT, ND; BS present.    EXTREMITIES:  2+ Peripheral Pulses, No clubbing, cyanosis, or edema    LYMPH: No lymphadenopathy noted    SKIN: No rashes or lesions      **************************** LABS *******************************************************                          12.6   9.29  )-----------( 275      ( 20 Jan 2025 12:35 )             38.7     01-20    139  |  103  |  29[H]  ----------------------------<  80  4.7   |  21  |  1.6[H]    Ca    9.7      20 Jan 2025 12:35  Mg     2.0     01-20    TPro  6.2  /  Alb  4.1  /  TBili  1.3[H]  /  DBili  x   /  AST  18  /  ALT  12  /  AlkPhos  72  01-20      Urinalysis Basic - ( 20 Jan 2025 12:35 )    Color: x / Appearance: x / SG: x / pH: x  Gluc: 80 mg/dL / Ketone: x  / Bili: x / Urobili: x   Blood: x / Protein: x / Nitrite: x   Leuk Esterase: x / RBC: x / WBC x   Sq Epi: x / Non Sq Epi: x / Bacteria: x        Lactate Trend        CAPILLARY BLOOD GLUCOSE              **************************Active Medications *******************************************  No Known Allergies      aspirin  chewable 81 milliGRAM(s) Oral daily  atorvastatin 20 milliGRAM(s) Oral at bedtime  cefTRIAXone   IVPB      cefTRIAXone   IVPB 1000 milliGRAM(s) IV Intermittent every 24 hours  chlorhexidine 2% Cloths 1 Application(s) Topical <User Schedule>  chlorhexidine 2% Cloths 1 Application(s) Topical <User Schedule>  cyanocobalamin 1000 MICROGram(s) Oral daily  enoxaparin Injectable 40 milliGRAM(s) SubCutaneous every 24 hours  hydrochlorothiazide 6.25 milliGRAM(s) Oral daily  lisinopril 5 milliGRAM(s) Oral daily  melatonin 3 milliGRAM(s) Oral at bedtime PRN  sodium chloride 0.9%. 1000 milliLiter(s) IV Continuous <Continuous>      ***************************************************  RADIOLOGY & ADDITIONAL TESTS:    Imaging Personally Reviewed:  [ ] YES  [ ] NO    HEALTH ISSUES - PROBLEM Dx:      
  JESÚS JACOBO  89y  Male      Patient is a 89y old  Male who presents with a chief complaint of altered mental status     INTERVAL HPI/OVERNIGHT EVENTS:      ******************************* REVIEW OF SYSTEMS:**********************************************    All other review of systems negative    *********************** VITALS ******************************************    T(F): 97.4 (01-20-25 @ 12:25)  HR: 88 (01-20-25 @ 12:25) (49 - 88)  BP: 121/71 (01-20-25 @ 12:25) (121/71 - 170/64)  RR: 18 (01-20-25 @ 12:25) (18 - 19)  SpO2: 94% (01-20-25 @ 12:25) (94% - 97%)    01-20-25 @ 07:01  -  01-20-25 @ 14:38  --------------------------------------------------------  IN: 0 mL / OUT: 200 mL / NET: -200 mL            01-20-25 @ 07:01  -  01-20-25 @ 14:38  --------------------------------------------------------  IN: 0 mL / OUT: 200 mL / NET: -200 mL        ******************************** PHYSICAL EXAM:**************************************************  GENERAL: NAD    PSYCH: no agitation, ? baseline mentation  HEENT:     NERVOUS SYSTEM:  Alert & Oriented X 1-2   PULMONARY: AMRITA, CTA    CARDIOVASCULAR: S1S2 RRR    GI: Soft, NT, ND; BS present.    EXTREMITIES:  2+ Peripheral Pulses, No clubbing, cyanosis, or edema    LYMPH: No lymphadenopathy noted    SKIN: No rashes or lesions      **************************** LABS *******************************************************                          12.6   9.29  )-----------( 275      ( 20 Jan 2025 12:35 )             38.7     01-20    139  |  103  |  29[H]  ----------------------------<  80  4.7   |  21  |  1.6[H]    Ca    9.7      20 Jan 2025 12:35  Mg     2.0     01-20    TPro  6.2  /  Alb  4.1  /  TBili  1.3[H]  /  DBili  x   /  AST  18  /  ALT  12  /  AlkPhos  72  01-20      Urinalysis Basic - ( 20 Jan 2025 12:35 )    Color: x / Appearance: x / SG: x / pH: x  Gluc: 80 mg/dL / Ketone: x  / Bili: x / Urobili: x   Blood: x / Protein: x / Nitrite: x   Leuk Esterase: x / RBC: x / WBC x   Sq Epi: x / Non Sq Epi: x / Bacteria: x        Lactate Trend        CAPILLARY BLOOD GLUCOSE              **************************Active Medications *******************************************  No Known Allergies      aspirin  chewable 81 milliGRAM(s) Oral daily  atorvastatin 20 milliGRAM(s) Oral at bedtime  cefTRIAXone   IVPB      cefTRIAXone   IVPB 1000 milliGRAM(s) IV Intermittent every 24 hours  chlorhexidine 2% Cloths 1 Application(s) Topical <User Schedule>  chlorhexidine 2% Cloths 1 Application(s) Topical <User Schedule>  cyanocobalamin 1000 MICROGram(s) Oral daily  enoxaparin Injectable 40 milliGRAM(s) SubCutaneous every 24 hours  hydrochlorothiazide 6.25 milliGRAM(s) Oral daily  lisinopril 5 milliGRAM(s) Oral daily  melatonin 3 milliGRAM(s) Oral at bedtime PRN  sodium chloride 0.9%. 1000 milliLiter(s) IV Continuous <Continuous>      ***************************************************  RADIOLOGY & ADDITIONAL TESTS:    Imaging Personally Reviewed:  [ ] YES  [ ] NO    HEALTH ISSUES - PROBLEM Dx:      
Patient is a 89y old Male who presents with a chief complaint of   Today is hospital day     Overnight events/Interval History:  - No acute overnight events  - At bedside, patient is A&Ox3 however delirious and agitated easily. Breathing comfortably on room air.     ROS:  - All ROS is negative unless indicated in HPI    Code Status: full    ALLERGIES:  No Known Allergies    MEDICATIONS:  STANDING MEDICATIONS  aspirin  chewable 81 milliGRAM(s) Oral daily  atorvastatin 20 milliGRAM(s) Oral at bedtime  cefTRIAXone   IVPB      cefTRIAXone   IVPB 1000 milliGRAM(s) IV Intermittent every 24 hours  cyanocobalamin 1000 MICROGram(s) Oral daily  enoxaparin Injectable 40 milliGRAM(s) SubCutaneous every 24 hours  hydrochlorothiazide 6.25 milliGRAM(s) Oral daily  lisinopril 5 milliGRAM(s) Oral daily  sodium chloride 0.9%. 1000 milliLiter(s) IV Continuous <Continuous>    PRN MEDICATIONS  melatonin 3 milliGRAM(s) Oral at bedtime PRN      VITALS LAST 24H:  Vital Signs Last 24 Hrs  T(C): 36.2 (2025 04:57), Max: 36.3 (2025 11:50)  T(F): 97.2 (2025 04:57), Max: 97.4 (2025 11:50)  HR: 49 (2025 04:57) (49 - 64)  BP: 145/69 (2025 04:57) (145/69 - 170/64)  BP(mean): --  RR: 19 (2025 04:57) (18 - 19)  SpO2: 95% (2025 04:57) (95% - 97%)    Parameters below as of 2025 04:57  Patient On (Oxygen Delivery Method): room air        PHYSICAL EXAM:  GENERAL: NAD  HEENT:  EOMI, Moist mucous membranes  CHEST/LUNG:  normal respiratory effort, no coughing, pt refused auscultation  ABDOMEN: pt refused exam  EXTREMITIES: No lower extremity edema bilaterally  NERVOUS SYSTEM:  A&Ox3, agitated    LABS:            Urinalysis Basic - ( 2025 22:59 )    Color: Yellow / Appearance: Clear / S.015 / pH: x  Gluc: x / Ketone: Negative mg/dL  / Bili: Negative / Urobili: 0.2 mg/dL   Blood: x / Protein: Negative mg/dL / Nitrite: Negative   Leuk Esterase: Small / RBC: 3 /HPF / WBC 2 /HPF   Sq Epi: x / Non Sq Epi: 2 /HPF / Bacteria: Occasional /HPF                RADIOLOGY:  CXR      CT

## 2025-01-23 NOTE — PROGRESS NOTE ADULT - PROVIDER SPECIALTY LIST ADULT
Internal Medicine
Internal Medicine
Hospitalist
Internal Medicine
Internal Medicine
Hospitalist
Internal Medicine
Internal Medicine

## 2025-02-01 DIAGNOSIS — F03.90 UNSPECIFIED DEMENTIA, UNSPECIFIED SEVERITY, WITHOUT BEHAVIORAL DISTURBANCE, PSYCHOTIC DISTURBANCE, MOOD DISTURBANCE, AND ANXIETY: ICD-10-CM

## 2025-02-01 DIAGNOSIS — E53.9 VITAMIN B DEFICIENCY, UNSPECIFIED: ICD-10-CM

## 2025-02-01 DIAGNOSIS — N18.4 CHRONIC KIDNEY DISEASE, STAGE 4 (SEVERE): ICD-10-CM

## 2025-02-01 DIAGNOSIS — D53.9 NUTRITIONAL ANEMIA, UNSPECIFIED: ICD-10-CM

## 2025-02-01 DIAGNOSIS — E87.1 HYPO-OSMOLALITY AND HYPONATREMIA: ICD-10-CM

## 2025-02-01 DIAGNOSIS — E87.5 HYPERKALEMIA: ICD-10-CM

## 2025-02-01 DIAGNOSIS — N30.00 ACUTE CYSTITIS WITHOUT HEMATURIA: ICD-10-CM

## 2025-02-01 DIAGNOSIS — N17.9 ACUTE KIDNEY FAILURE, UNSPECIFIED: ICD-10-CM

## 2025-02-01 DIAGNOSIS — E78.5 HYPERLIPIDEMIA, UNSPECIFIED: ICD-10-CM

## 2025-02-01 DIAGNOSIS — G92.8 OTHER TOXIC ENCEPHALOPATHY: ICD-10-CM

## 2025-02-01 DIAGNOSIS — I12.9 HYPERTENSIVE CHRONIC KIDNEY DISEASE WITH STAGE 1 THROUGH STAGE 4 CHRONIC KIDNEY DISEASE, OR UNSPECIFIED CHRONIC KIDNEY DISEASE: ICD-10-CM

## 2025-03-06 ENCOUNTER — APPOINTMENT (OUTPATIENT)
Dept: NEUROLOGY | Facility: CLINIC | Age: 89
End: 2025-03-06

## 2025-07-30 ENCOUNTER — APPOINTMENT (OUTPATIENT)
Dept: OTOLARYNGOLOGY | Facility: CLINIC | Age: 89
End: 2025-07-30